# Patient Record
Sex: FEMALE | Race: WHITE
[De-identification: names, ages, dates, MRNs, and addresses within clinical notes are randomized per-mention and may not be internally consistent; named-entity substitution may affect disease eponyms.]

---

## 2017-05-20 NOTE — EDM.PDOC
[id number]: TICK BITE


Time Seen by Provider: 05/20/17 20:10


Source of Information: Reports: Patient


History Limitations: Reports: No Limitations





- History of Present Illness


INITIAL COMMENTS - FREE TEXT/NARRATIVE: 





pt has a small tick imbedded in area between her breasts. She was not able to 

get it completely out. 


Onset: Today


Duration: Hour(s):, Other (pt removed part of the tick. )


Location: Reports: Chest


Associated Symptoms: Reports: No Other Symptoms





- Related Data


 Allergies











Allergy/AdvReac Type Severity Reaction Status Date / Time


 


oxaprozin [Oxaprozin] Allergy Unknown Cannot Unverified 05/20/17 19:38





   Remember  











Home Meds: 


 Home Meds





Aspirin [Low Dose Aspirin EC] 81 mg PO DAILY 10/01/14 [History]


Clopidogrel Bisulfate [Clopidogrel] 75 mg PO DAILY 10/01/14 [History]


Levothyroxine Sodium [Synthroid] 75 mcg PO DAILY 10/01/14 [History]


Lisinopril 40 mg PO DAILY 10/01/14 [History]


Multivitamin [Daily Multiple Vitamin] 1 tab PO DAILY 10/01/14 [History]


Rosuvastatin [Crestor] 40 mg PO BEDTIME 10/01/14 [History]


amLODIPine Besylate [Amlodipine Besylate] 10 mg PO DAILY 10/01/14 [History]


metFORMIN [Glucophage XR] 500 mg PO DAILY 10/01/14 [History]


Atenolol 50 mg PO DAILY #30 tablet 10/03/14 [Rx]


Hydrocodone/Acetaminophen [Hydrocodon-Acetaminophen 5-325] 1 - 2 tab PO Q6H PRN 

03/02/15 [History]


Omeprazole [Prilosec] 20 mg PO DAILY 03/02/15 [History]


predniSONE [Prednisone] 5 mg PO DAILY 03/02/15 [History]











Past Medical History





- Infectious Disease History


Infectious Disease History: Reports: Chicken Pox





Social & Family History





- Tobacco Use


Smoking Status *Q: Never Smoker


Years of Tobacco use: 30


Used Tobacco, but Quit: Yes


Month Tobacco Last Used: 1999


Second Hand Smoke Exposure: No





- Caffeine Use


Caffeine Use: Reports: Coffee, Tea





- Alcohol Use


Days Per Week of Alcohol Use: 0


Number of Drinks Per Day: 1


Total Drinks Per Week: 0





- Recreational Drug Use


Recreational Drug Use: No





ED ROS GENERAL





- Review of Systems


Review Of Systems: See Below


Constitutional: Reports: No Symptoms


HEENT: Reports: No Symptoms


Respiratory: Reports: No Symptoms


Cardiovascular: Reports: No Symptoms


Endocrine: Reports: No Symptoms


GI/Abdominal: Reports: No Symptoms


: Reports: No Symptoms


Skin: Reports: Other ( deer tick stuck between her breasts. This was cleaned 

and removed without difficulty. )





ED EXAM, ANIMAL BITE





- Physical Exam


Exam: See Below


Exam Limited By: No Limitations


General Appearance: Alert


Ears: Normal External Exam


Nose: Normal Inspection


Throat/Mouth: Normal Inspection


Skin Exam: Other (pt had a deer tick stuck between her breasts . Only part of 

the tick was removed. The rest was removed with out difficulty)





Course





- Vital Signs


Last Recorded V/S: 


 Last Vital Signs











Temp  35.9 C   05/20/17 19:38


 


Pulse  70   05/20/17 19:38


 


Resp  16   05/20/17 19:38


 


BP  156/82 H  05/20/17 19:38


 


Pulse Ox  95   05/20/17 19:38














Departure





- Departure


Time of Disposition: 20:05


Disposition: Home, Self-Care 01


Condition: fair


Clinical Impression: 


 Dermacentor andersoni tick bite








- Discharge Information


Instructions:  Insect Bite, Easy-to-Read


Referrals: 


Tejas Oconnor MD [Primary Care Provider] - 


Forms:  ED Department Discharge


Care Plan Goals: 


doxycyline 100mg bid for 7 days.  moist warm packs.

## 2017-11-23 NOTE — EDM.PDOC
ED HPI GENERAL MEDICAL PROBLEM





- General


Chief Complaint: Neuro Symptoms/Deficits


Stated Complaint: MEDICAL VIA NORTH


Time Seen by Provider: 11/23/17 14:00


Source of Information: Reports: Patient, EMS, Family


History Limitations: Reports: No Limitations





- History of Present Illness


INITIAL COMMENTS - FREE TEXT/NARRATIVE: 





77-year-old female had just finished a large meal when she felt the pain in her 

shoulders and felt uncomfortable. She then felt lightheaded and woozy, her 

speech was slurred and she became very pale and diaphoretic. She had had a 

previous stroke in the family got very concerned so called EMS. A family member 

was an EMT and took her blood pressure which was low and her pulse was only 60. 

When EMS arrived her symptoms were resolving, she was stable in route and had 

no neurologic deficits. She had no chest pain, shortness of breath, 

palpitations or other symptoms.


Onset: Sudden


Severity: Moderate


Associated Symptoms: Reports: Diaphoresis, Weakness.  Denies: Fever/Chills, 

Headaches, Nausea/Vomiting, Shortness of Breath


  ** Right Shoulder


Pain Score (Numeric/FACES): 1





- Related Data


 Allergies











Allergy/AdvReac Type Severity Reaction Status Date / Time


 


oxaprozin [Oxaprozin] Allergy Unknown Cannot Verified 11/23/17 13:55





   Remember  











Home Meds: 


 Home Meds





Aspirin [Low Dose Aspirin EC] 81 mg PO DAILY 10/01/14 [History]


Clopidogrel Bisulfate [Clopidogrel] 75 mg PO DAILY 10/01/14 [History]


Levothyroxine Sodium [Synthroid] 75 mcg PO DAILY 10/01/14 [History]


Lisinopril 40 mg PO DAILY 10/01/14 [History]


Multivitamin [Daily Multiple Vitamin] 1 tab PO DAILY 10/01/14 [History]


Rosuvastatin [Crestor] 40 mg PO BEDTIME 10/01/14 [History]


amLODIPine Besylate [Amlodipine Besylate] 10 mg PO DAILY 10/01/14 [History]


metFORMIN [Glucophage XR] 500 mg PO DAILY 10/01/14 [History]


Atenolol 50 mg PO DAILY #30 tablet 10/03/14 [Rx]


Hydrocodone/Acetaminophen [Hydrocodon-Acetaminophen 5-325] 1 - 2 tab PO Q6H PRN 

03/02/15 [History]


Omeprazole [Prilosec] 20 mg PO DAILY 03/02/15 [History]


predniSONE [Prednisone] 5 mg PO DAILY 03/02/15 [History]











Past Medical History


Cardiovascular History: Reports: High Cholesterol, Hypertension


OB/GYN History: Reports: Pregnancy


Other OB/BYN History: HYSTERECTOMY


Neurological History: Reports: CVA


Endocrine/Metabolic History: Reports: Diabetes, Type II, Hypothyroidism


Other Hematologic History: BILAT LE DVT





- Infectious Disease History


Infectious Disease History: Reports: Chicken Pox





- Past Surgical History


Other HEENT Surgeries/Procedures: PREVIOUS CVA 2002


Musculoskeletal Surgical History: Reports: Knee Replacement





Social & Family History





- Tobacco Use


Smoking Status *Q: Unknown Ever Smoked


Years of Tobacco use: 30


Used Tobacco, but Quit: Yes


Month Tobacco Last Used: 1999


Second Hand Smoke Exposure: No





- Caffeine Use


Caffeine Use: Reports: Coffee, Tea





- Alcohol Use


Days Per Week of Alcohol Use: 0


Number of Drinks Per Day: 1


Total Drinks Per Week: 0





- Recreational Drug Use


Recreational Drug Use: No





ED ROS GENERAL





- Review of Systems


Review Of Systems: See Below


Constitutional: Denies: Fever, Chills


Respiratory: Denies: Shortness of Breath


Cardiovascular: Denies: Chest Pain, Palpitations


GI/Abdominal: Denies: Abdominal Pain


Musculoskeletal: Reports: Shoulder Pain (Bilateral shoulder pain), Other (

Chronic arthritic pains)


Skin: Reports: Pallor, Diaphoresis





ED EXAM, NEURO





- Physical Exam


Exam: See Below


Exam Limited By: No Limitations


General Appearance: Alert, No Apparent Distress


Eye Exam: Bilateral Eye: Normal Inspection


Throat/Mouth: Normal Inspection


Neck: Normal Inspection


Respiratory/Chest: No Respiratory Distress, Lungs Clear


Cardiovascular: Regular Rate, Rhythm.  No: Extra Beats


GI/Abdominal: Normal Bowel Sounds


Neurological: Alert, No Motor/Sensory Deficits, Oriented x 3


Psychiatric: Normal Affect, Normal Mood


Skin Exam: Warm, Dry





Course





- Vital Signs


Last Recorded V/S: 


 Last Vital Signs











Temp  97.9 F   11/23/17 13:49


 


Pulse  72   11/23/17 14:53


 


Resp  14   11/23/17 14:15


 


BP  119/58 L  11/23/17 14:53


 


Pulse Ox  94 L  11/23/17 13:49














- Re-Assessments/Exams


Free Text/Narrative Re-Assessment/Exam: 





11/23/17 14:20


Glucose was checked by EMS which was normal. EKG by EMS was also normal. The 

symptoms this patient had are classic for vasovagal syncope or near syncope, 

and she just recently had labs drawn by her rheumatologist for medication 

changes. She was ambulated without difficulty so was discharged without any 

further workup.








Departure





- Departure


Time of Disposition: 15:02


Disposition: Home, Self-Care 01


Condition: Good


Clinical Impression: 


 Vasovagal near syncope








- Discharge Information


Instructions:  Near-Syncope, Easy-to-Read


Referrals: 


Tejas Oconnor MD [Primary Care Provider] - 


Forms:  ED Department Discharge


Care Plan Goals: 


Continue your current medications, stay hydrated and increase activity as 

tolerated. Return anytime if worsening or concerns.

## 2020-06-20 ENCOUNTER — HOSPITAL ENCOUNTER (EMERGENCY)
Dept: HOSPITAL 11 - JP.ED | Age: 80
Discharge: NURSING, REHAB OR LONG-TERM CARE | End: 2020-06-20
Payer: MEDICARE

## 2020-06-20 VITALS — SYSTOLIC BLOOD PRESSURE: 134 MMHG | HEART RATE: 73 BPM | DIASTOLIC BLOOD PRESSURE: 53 MMHG

## 2020-06-20 DIAGNOSIS — Z79.899: ICD-10-CM

## 2020-06-20 DIAGNOSIS — E11.9: ICD-10-CM

## 2020-06-20 DIAGNOSIS — N13.2: Primary | ICD-10-CM

## 2020-06-20 DIAGNOSIS — Z87.891: ICD-10-CM

## 2020-06-20 DIAGNOSIS — N28.9: ICD-10-CM

## 2020-06-20 DIAGNOSIS — R11.2: ICD-10-CM

## 2020-06-20 DIAGNOSIS — E03.9: ICD-10-CM

## 2020-06-20 DIAGNOSIS — E78.00: ICD-10-CM

## 2020-06-20 DIAGNOSIS — Z79.82: ICD-10-CM

## 2020-06-20 DIAGNOSIS — Z86.73: ICD-10-CM

## 2020-06-20 DIAGNOSIS — I10: ICD-10-CM

## 2020-06-20 DIAGNOSIS — Z88.6: ICD-10-CM

## 2020-06-20 PROCEDURE — 96375 TX/PRO/DX INJ NEW DRUG ADDON: CPT

## 2020-06-20 PROCEDURE — 82272 OCCULT BLD FECES 1-3 TESTS: CPT

## 2020-06-20 PROCEDURE — 87086 URINE CULTURE/COLONY COUNT: CPT

## 2020-06-20 PROCEDURE — 74176 CT ABD & PELVIS W/O CONTRAST: CPT

## 2020-06-20 PROCEDURE — 86140 C-REACTIVE PROTEIN: CPT

## 2020-06-20 PROCEDURE — 96361 HYDRATE IV INFUSION ADD-ON: CPT

## 2020-06-20 PROCEDURE — 36415 COLL VENOUS BLD VENIPUNCTURE: CPT

## 2020-06-20 PROCEDURE — 85025 COMPLETE CBC W/AUTO DIFF WBC: CPT

## 2020-06-20 PROCEDURE — 96374 THER/PROPH/DIAG INJ IV PUSH: CPT

## 2020-06-20 PROCEDURE — 81001 URINALYSIS AUTO W/SCOPE: CPT

## 2020-06-20 PROCEDURE — 80053 COMPREHEN METABOLIC PANEL: CPT

## 2020-06-20 PROCEDURE — 99285 EMERGENCY DEPT VISIT HI MDM: CPT

## 2020-06-20 NOTE — CRLCT
INDICATION:



Right-sided pain, renal insufficiency 



TECHNIQUE:



CT abdomen and pelvis without contrast.



COMPARISON:



None 



FINDINGS:



The visualized portions of the lung bases are clear. 



There is moderate right hydronephrosis with a 8 mm obstructing stone (HU 

310) in the proximal right ureter, approximately 3.5 cm from the 

ureteropelvic junction. There is moderate perinephric fat stranding. 

Additional punctate stones are seen within both kidneys. The largest stone 

on the left is seen within the upper pole and measures 4 mm. Negative for 

left hydronephrosis. The bladder is partially distended and unremarkable. 



Evaluation of the abdominal viscera is limited due to lack of IV contrast. 

There is a 0.8 cm hypoattenuating lesion within the posterior aspect of the 

right hepatic lobe, too small to fully characterize, however likely 

represents a simple cyst. There remainder of the unenhanced liver is 

unremarkable. The gallbladder is nondistended. The spleen, pancreas and 

adrenal glands are unremarkable. 



There are no dilated loops of small bowel to suggest obstruction.The 

appendix is not visualized.There is a trace amount of free fluid within the 

pelvis. Negative for intraperitoneal free air. 



The visualized osseous structures are unremarkable. There is moderate 

atherosclerotic calcification of the abdominal aorta and common iliacs. 



IMPRESSION:



1. Moderate right hydronephrosis and perinephric inflammatory changes with 

a 8 mm obstructing stone in the proximal right ureter. 



2. Additional nonobstructing stones bilaterally.



Dictated by Diamond Michael MD @ 6/20/2020 11:14:35 AM



Please note that all CT scans at this facility use dose modulation, 

iterative reconstruction, and/or weight-based dosing when appropriate to 

reduce radiation dose to as low as reasonably achievable.



Dictated by: Diamond Michael MD @ 06/20/2020 11:14:49



(Electronically Signed)

## 2020-06-20 NOTE — EDM.PDOC
ED HPI GENERAL MEDICAL PROBLEM





- General


Chief Complaint: Abdominal Pain


Stated Complaint: R ABD PAIN, DIARRHEA, VOMITING


Time Seen by Provider: 06/20/20 09:15


Source of Information: Reports: Patient, Family


History Limitations: Reports: No Limitations





- History of Present Illness


INITIAL COMMENTS - FREE TEXT/NARRATIVE: 





pt arrived with rt sided abdomanal pain. She started vomiting in the nite and 

she ended up with alot of loose stools.   The pain has been difficult. 


Onset: Today, Other ( started in the nite. )


Duration: Hour(s):


Location: Reports: Abdomen


Associated Symptoms: Reports: Nausea/Vomiting, Other ( diarrhea)


  ** Right Lower Abdomen


Pain Score (Numeric/FACES): 7





- Related Data


                                    Allergies











Allergy/AdvReac Type Severity Reaction Status Date / Time


 


oxaprozin [Oxaprozin] Allergy Unknown Cannot Verified 06/20/20 08:59





   Remember  











Home Meds: 


                                    Home Meds





Aspirin [Low Dose Aspirin EC] 81 mg PO DAILY 10/01/14 [History]


Clopidogrel Bisulfate [Clopidogrel] 75 mg PO DAILY 10/01/14 [History]


Levothyroxine Sodium [Synthroid] 75 mcg PO DAILY 10/01/14 [History]


Lisinopril 40 mg PO DAILY 10/01/14 [History]


Multivitamin [Daily Multiple Vitamin] 1 tab PO DAILY 10/01/14 [History]


Rosuvastatin [Crestor] 40 mg PO BEDTIME 10/01/14 [History]


amLODIPine Besylate [Amlodipine Besylate] 10 mg PO DAILY 10/01/14 [History]


metFORMIN [Glucophage XR] 500 mg PO DAILY 10/01/14 [History]


atenoloL [Atenolol] 50 mg PO DAILY #30 tablet 10/03/14 [Rx]


Omeprazole [Prilosec] 20 mg PO DAILY 03/02/15 [History]











Past Medical History


HEENT History: Reports: Impaired Vision


Cardiovascular History: Reports: High Cholesterol, Hypertension


OB/GYN History: Reports: Pregnancy


Other OB/GYN History: HYSTERECTOMY


Neurological History: Reports: CVA


Endocrine/Metabolic History: Reports: Diabetes, Type II, Hypothyroidism


Other Hematologic History: BILAT LE DVT





- Infectious Disease History


Infectious Disease History: Reports: Chicken Pox, Measles, Mumps





- Past Surgical History


Other HEENT Surgeries/Procedures: PREVIOUS CVA 2002


GI Surgical History: Reports: Colonoscopy


Female  Surgical History: Reports: Hysterectomy, Salpingo-Oophorectomy


Musculoskeletal Surgical History: Reports: Knee Replacement





Social & Family History





- Tobacco Use


Smoking Status *Q: Former Smoker


Years of Tobacco use: 30


Packs/Tins Daily: 1


Used Tobacco, but Quit: Yes


Month/Year Tobacco Last Used: 1999


Second Hand Smoke Exposure: No





- Caffeine Use


Caffeine Use: Reports: Coffee





- Recreational Drug Use


Recreational Drug Use: No





ED ROS GENERAL





- Review of Systems


Review Of Systems: See Below


Constitutional: Reports: Diaphoresis


HEENT: Reports: No Symptoms


Respiratory: Reports: No Symptoms


Cardiovascular: Reports: No Symptoms


Endocrine: Reports: No Symptoms


GI/Abdominal: Reports: Abdominal Pain, Diarrhea, Nausea, Vomiting


: Reports: No Symptoms


Musculoskeletal: Reports: No Symptoms


Skin: Reports: No Symptoms





ED EXAM, GI/ABD





- Physical Exam


Exam: See Below


Text/Narrative:: 





pt arrived with severe rt lower abdomanal pain.  She has been vomiting most of 

the nite. 


Exam Limited By: No Limitations


General Appearance: Alert, Anxious, Moderate Distress, Other (pupils equal and 

reactive to lite. )


Ears: Normal TMs


Nose: Normal Inspection


Throat/Mouth: Normal Inspection


Head: Atraumatic


Neck: Normal Inspection


Respiratory/Chest: No Respiratory Distress


Cardiovascular: Regular Rate, Rhythm


GI/Abdominal Exam: Soft, Tender, Other (pt is tender in the rt lower abdoman. )


 (Female) Exam: Deferred


Rectal (Female) Exam: Deferred


Back Exam: Normal Inspection


Extremities: Normal Inspection





Course





- Vital Signs


Last Recorded V/S: 


                                Last Vital Signs











Temp  36.0 C L  06/20/20 09:11


 


Pulse  73   06/20/20 12:52


 


Resp  16   06/20/20 09:11


 


BP  134/53 L  06/20/20 12:52


 


Pulse Ox  94 L  06/20/20 12:52














- Orders/Labs/Meds


Labs: 


                                Laboratory Tests











  06/20/20 06/20/20 06/20/20 Range/Units





  08:56 09:06 09:06 


 


WBC   10.4   (4.5-11.0)  K/uL


 


RBC   3.55   (3.30-5.50)  M/uL


 


Hgb   10.4 L   (12.0-15.0)  g/dL


 


Hct   32.8 L   (36.0-48.0)  %


 


MCV   92   (80-98)  fL


 


MCH   29   (27-31)  pg


 


MCHC   32   (32-36)  %


 


Plt Count   277   (150-400)  K/uL


 


Neut % (Auto)   81 H   (36-66)  %


 


Lymph % (Auto)   13 L   (24-44)  %


 


Mono % (Auto)   6   (2-6)  %


 


Eos % (Auto)   0 L   (2-4)  %


 


Baso % (Auto)   0   (0-1)  %


 


Sodium    141  (140-148)  mmol/L


 


Potassium    4.3  (3.6-5.2)  mmol/L


 


Chloride    105  (100-108)  mmol/L


 


Carbon Dioxide    22  (21-32)  mmol/L


 


Anion Gap    14.1 H  (5.0-14.0)  mmol/L


 


BUN    33 H  (7-18)  mg/dL


 


Creatinine    2.4 H D  (0.6-1.0)  mg/dL


 


Est Cr Clr Drug Dosing    15.46  mL/min


 


Estimated GFR (MDRD)    19 L  (>60)  


 


Glucose    171 H  ()  mg/dL


 


Calcium    10.0  (8.5-10.1)  mg/dL


 


Total Bilirubin    0.3  (0.2-1.0)  mg/dL


 


AST    19  (15-37)  U/L


 


ALT    29  (12-78)  U/L


 


Alkaline Phosphatase    175 H D  ()  U/L


 


C-Reactive Protein    1.66 H  (0.0-0.3)  mg/dL


 


Total Protein    7.2  (6.4-8.2)  g/dL


 


Albumin    3.3 L  (3.4-5.0)  g/dL


 


Globulin    3.9 H  (2.3-3.5)  g/dL


 


Albumin/Globulin Ratio    0.9 L  (1.2-2.2)  


 


Urine Color  Yellow    (YELLOW)  


 


Urine Appearance  Cloudy A    (CLEAR)  


 


Urine pH  6.0    (5.0-8.0)  


 


Ur Specific Gravity  1.025    (1.008-1.030)  


 


Urine Protein  >=300 H    (NEGATIVE)  mg/dL


 


Urine Glucose (UA)  Negative    (NEGATIVE)  mg/dL


 


Urine Ketones  Trace H    (NEGATIVE)  mg/dL


 


Urine Occult Blood  Large H    (NEGATIVE)  


 


Urine Nitrite  Negative    (NEGATIVE)  


 


Urine Bilirubin  Negative    (NEGATIVE)  


 


Urine Urobilinogen  0.2    (0.2-1.0)  EU/dL


 


Ur Leukocyte Esterase  Negative    (NEGATIVE)  


 


Urine RBC  50-75 H    (0-5)  


 


Urine WBC  0-5    (0-5)  


 


Ur Epithelial Cells  Not seen    


 


Amorphous Sediment  Not seen    


 


Urine Bacteria  Not seen    


 


Urine Mucus  Moderate    


 


Urine Other      











Meds: 


Medications














Discontinued Medications














Generic Name Dose Route Start Last Admin





  Trade Name Wong  PRN Reason Stop Dose Admin


 


Hydromorphone HCl  0.5 mg  06/20/20 09:44  06/20/20 11:01





  Dilaudid  IVPUSH  06/20/20 09:45  0.5 mg





  ONETIME ONE   Administration


 


Sodium Chloride  1,000 mls @ 999 mls/hr  06/20/20 09:45  06/20/20 10:59





  Normal Saline  IV   999 mls/hr





  ASDIRECTED EDSON   Administration


 


Sodium Chloride  1,000 mls @ 500 mls/hr  06/20/20 11:15  06/20/20 12:44





  Normal Saline  IV   500 mls/hr





  ASDIRECTED EDSON   Administration


 


Ondansetron HCl  4 mg  06/20/20 09:43  06/20/20 11:00





  Zofran  IVPUSH  06/20/20 09:44  4 mg





  ONETIME ONE   Administration


 


Tamsulosin HCl  0.4 mg  06/20/20 12:50  06/20/20 12:57





  Flomax  PO  06/20/20 12:51  0.4 mg





  ONETIME ONE   Administration














- Re-Assessments/Exams


Free Text/Narrative Re-Assessment/Exam: 





06/20/20 12:26


pt has a gfr of 19. She recently had much better kidney funtion. She does not 

have a fever


06/20/20 13:11


pt has a 8mm stone in the rt ureter ith obstruction


06/20/20 13:11


pt was given rocephen 1 gm iv. 


06/22/20 07:13








Departure





- Departure


Time of Disposition: 13:12


Disposition: DC/[facility]fer to Acute Hospital 02


Condition: Fair


Clinical Impression: 


 Renal insufficiency, Ureteral calculus, right








- Discharge Information


Referrals: 


PCP,None [Primary Care Provider] - 


Forms:  ED Department Discharge


Care Plan Goals: 


 transfer to [facility]. 





Sepsis Event Note (ED)





- Evaluation


Sepsis Screening Result: No Definite Risk What Is The Reason For Today's Visit?: the development of new lesions

## 2021-03-18 ENCOUNTER — HOSPITAL ENCOUNTER (OUTPATIENT)
Dept: HOSPITAL 11 - JP.BLOODTR | Age: 81
Setting detail: OBSERVATION
LOS: 1 days | Discharge: HOME | End: 2021-03-19
Attending: INTERNAL MEDICINE | Admitting: INTERNAL MEDICINE
Payer: MEDICARE

## 2021-03-18 DIAGNOSIS — E78.00: ICD-10-CM

## 2021-03-18 DIAGNOSIS — D72.819: ICD-10-CM

## 2021-03-18 DIAGNOSIS — Z79.899: ICD-10-CM

## 2021-03-18 DIAGNOSIS — N18.32: ICD-10-CM

## 2021-03-18 DIAGNOSIS — Z79.890: ICD-10-CM

## 2021-03-18 DIAGNOSIS — E03.9: ICD-10-CM

## 2021-03-18 DIAGNOSIS — Z88.8: ICD-10-CM

## 2021-03-18 DIAGNOSIS — E11.22: ICD-10-CM

## 2021-03-18 DIAGNOSIS — Z88.2: ICD-10-CM

## 2021-03-18 DIAGNOSIS — I12.9: ICD-10-CM

## 2021-03-18 DIAGNOSIS — C25.9: ICD-10-CM

## 2021-03-18 DIAGNOSIS — Z79.82: ICD-10-CM

## 2021-03-18 DIAGNOSIS — D64.81: Primary | ICD-10-CM

## 2021-03-18 DIAGNOSIS — Z98.890: ICD-10-CM

## 2021-03-18 DIAGNOSIS — D69.6: ICD-10-CM

## 2021-03-18 DIAGNOSIS — R60.0: ICD-10-CM

## 2021-03-18 PROCEDURE — P9034 PLATELETS, PHERESIS: HCPCS

## 2021-03-18 PROCEDURE — P9016 RBC LEUKOCYTES REDUCED: HCPCS

## 2021-03-18 PROCEDURE — G0378 HOSPITAL OBSERVATION PER HR: HCPCS

## 2021-03-18 RX ADMIN — METOPROLOL TARTRATE SCH MG: 50 TABLET, FILM COATED ORAL at 20:25

## 2021-03-18 NOTE — PCM.HP.2
H&P History of Present Illness





- General


Date of Service: 03/18/21


Admit Problem/Dx: 


                           Admission Diagnosis/Problem





Admission Diagnosis/Problem      Anemia








Source of Information: Patient, Family, Provider, RN Notes Reviewed


History Limitations: Reports: No Limitations





- History of Present Illness


Initial Comments - Free Text/Narative: 





Ms. Alvarenga is an 80-year-old woman who was admitted as a direct admission from 

the outpatient area with anemia and severe thrombocytopenia.  She has a known 

history of stage IV pancreatic carcinoma and has been receiving chemotherapy 

every other week.  There has been a good response with current chemotherapeutic 

regimen and a good decrease in tumor marker.  Since her last chemotherapy 

approximately 1 week ago she has felt more weak and tired.  Appetite has been 

somewhat poor.  Labs were obtained today, hemoglobin was found to be 7.5 which 

is a decrease over the past week and platelets were 7000.  She has had no 

evidence of spontaneous bleeding or underlying infection.  During the past week 

is also developed mild to moderate peripheral edema both lower extremities as 

well as her right upper extremity.  She has had a history of chronic kidney 

disease stage III, over the past week there has been an increase in her 

creatinine from 1.4 to 2.1.





- Related Data


Allergies/Adverse Reactions: 


                                    Allergies











Allergy/AdvReac Type Severity Reaction Status Date / Time


 


oxaprozin [Oxaprozin] Allergy Unknown Cannot Verified 02/19/21 08:39





   Remember  











Home Medications: 


                                    Home Meds





Aspirin [Low Dose Aspirin EC] 81 mg PO DAILY 10/01/14 [History]


Clopidogrel Bisulfate [Clopidogrel] 75 mg PO DAILY 10/01/14 [History]


Levothyroxine Sodium [Synthroid] 75 mcg PO DAILY 10/01/14 [History]


Rosuvastatin [Crestor] 40 mg PO BEDTIME 10/01/14 [History]


amLODIPine Besylate [Amlodipine Besylate] 10 mg PO DAILY 10/01/14 [History]


Omeprazole [Prilosec] 20 mg PO DAILY 03/02/15 [History]


Acetaminophen 650 mg PO Q6HR PRN 12/11/20 [History]


Cholecalciferol (Vitamin D3) [Vitamin D3] 1,000 unit PO DAILY 12/11/20 [History]


Cyanocobalamin (Vitamin B-12) [Vitamin B-12] 1,000 mcg PO DAILY 12/11/20 

[History]


Docusate Sodium [Colace] 100 mg PO BID PRN 12/11/20 [History]


Metoprolol Tartrate [Lopressor] 50 mg PO BID 12/11/20 [History]


bisacodyL [Dulcolax] 5 mg PO DAILY PRN 12/11/20 [History]











Past Medical History


HEENT History: Reports: Impaired Vision


Cardiovascular History: Reports: High Cholesterol, Hypertension


OB/GYN History: Reports: Pregnancy


Other OB/BYN History: HYSTERECTOMY


Neurological History: Reports: CVA


Endocrine/Metabolic History: Reports: Diabetes, Type II, Hypothyroidism


Other Hematologic History: BILAT LE DVT





- Infectious Disease History


Infectious Disease History: Reports: Chicken Pox, Measles, Mumps





- Past Surgical History


Other HEENT Surgeries/Procedures: PREVIOUS CVA 2002


GI Surgical History: Reports: Colonoscopy


Female  Surgical History: Reports: Hysterectomy, Salpingo-Oophorectomy


Musculoskeletal Surgical History: Reports: Knee Replacement





Social & Family History





- Caffeine Use


Caffeine Use: Reports: Coffee





H&P Review of Systems





- Review of Systems:


Review Of Systems: See Below


General: Reports: Malaise, Weakness, Fatigue, Decreased Appetite.  Denies: 

Fever, Chills, Diaphoresis


HEENT: Reports: No Symptoms


Pulmonary: Reports: No Symptoms


Cardiovascular: Reports: Edema.  Denies: Chest Pain, Palpitations, Dyspnea on 

Exertion, Orthopnea, PND


Gastrointestinal: Reports: No Symptoms


Genitourinary: Reports: No Symptoms


Musculoskeletal: Reports: No Symptoms


Skin: Reports: No Symptoms


Psychiatric: Reports: No Symptoms


Neurological: Reports: No Symptoms


Hematologic/Lymphatic: Reports: Anemia


Immunologic: Reports: No Symptoms





Exam





- Exam


Exam: See Below





- Vital Signs


Vital Signs: 


                                Last Vital Signs











Temp  96.8 F L  03/18/21 12:32


 


Pulse  70   03/18/21 12:32


 


Resp  16   03/18/21 12:32


 


BP  118/59 L  03/18/21 12:32


 


Pulse Ox  99   03/18/21 12:32














- Exam


Quality Assessment: DVT Prophylaxis


General: Alert, Oriented, Cooperative, Mild Distress


HEENT: Conjunctiva Clear, Hearing Intact, Mucosa Moist & Pink, Normal Nasal 

Septum, Posterior Pharynx Clear, Pupils Equal


Neck: Supple, Trachea Midline, +2 Carotid Pulse wo Bruit


Lungs: Clear to Auscultation, Normal Respiratory Effort


Cardiovascular: Regular Rate, Regular Rhythm, Normal S1, Normal S2.  No: 

Systolic Murmur, Diastolic Murmur


GI/Abdominal Exam: Soft, Non-Tender, No Organomegaly, No Distention


Back Exam: Normal Inspection, Full Range of Motion


Extremities: Non-Tender, Pedal Edema


Skin: Warm, Dry, Intact


Neurological: Cranial Nerves Intact, Strength Equal Bilateral, Normal Speech, 

Normal Tone, Sensation Intact.  No: Focal Deficit


Neuro Extensive - Mental Status: Alert, Oriented x3, Normal Mood/Affect, Normal 

Cognition, Memory Intact





Sepsis Event Note





- Focused Exam


Vital Signs: 


                                   Vital Signs











  Temp Pulse Resp BP Pulse Ox


 


 03/18/21 12:32  96.8 F L  70  16  118/59 L  99














*Q Meaningful Use (ADM)





- VTE *Q


VTE Pharmacological Contraindications *Q: Thrombocytopenia





- VTE Risk Assess *Q


Each Risk Factor Represents 1 Point: None


Total Score 1 Point Risk Factors: 0


Each Risk Factor Represents 2 Points: Malignancy (present or previous)


Total Score 2 Point Risk Factors: 2


Each Risk Factor Represents 3 Points: Age 75 Years or Greater


Total Score 3 Point Risk Factors: 3


Each Risk Factor Represents 5 Points: None


Total Score 5 Point Risk Factors: 0


Venous Thromboembolism Risk Factor Score *Q: 5


Problem List Initiated/Reviewed/Updated: Yes


Orders Last 24hrs: 


                               Active Orders 24 hr











 Category Date Time Status


 


 Patient Status Manage Transfer [TRANSFER] Routine ADT  03/18/21 12:06 Active


 


 PLATELETS [facility] [BBK] Routine Lab  03/18/21 10:54 Ordered


 


 RED BLOOD CELLS LP [BBK] Routine Lab  03/18/21 10:54 Ordered


 


 TYPE AND SCREEN [BBK] Routine Lab  03/18/21 10:54 Ordered


 


 Furosemide [Lasix] Med  03/18/21 14:00 Once





 20 mg IVPUSH ONETIME ONE   


 


 Sodium Chloride 0.9% [Normal Saline] 1,000 ml Med  03/18/21 12:30 Active





 IV ASDIRECTED   


 


 Transfuse Platelets [COMM] Urgent Oth  03/18/21 16:00 Ordered


 


 Transfuse Red Blood Cells [COMM] Urgent Oth  03/18/21 10:54 Ordered


 


 Resuscitation Status Routine Resus Stat  03/18/21 12:08 Ordered








                                Medication Orders





Furosemide (Furosemide 20 Mg/2 Ml Vial)  20 mg IVPUSH ONETIME ONE


   Stop: 03/18/21 14:01


Sodium Chloride (Normal Saline)  1,000 mls @ 50 mls/hr IV ASDIRECTED EDSON


   Stop: 03/18/21 18:00


   Last Admin: 03/18/21 12:15  Dose: 50 mls/hr


   Documented by: CARLOS








Assessment/Plan Comment:: 





ASSESSMENT AND PLAN





ANEMIA AND THROMBOCYTOPENIA-secondary to recent chemotherapy, there is also 

leukopenia but not absolute neutropenia


-Transfuse 2 units of red blood cells


-Transfuse 1 unit of platelets


-Follow-up labs in a.m.





STAGE IV PANCREATIC CARCINOMA-she has been receiving palliative chemotherapy





CHRONIC KIDNEY DISEASE STAGE IIIb-creatinine has increased over the last week 

and now to 2.1.  Possibly secondary to dehydration and intravascular volume 

depletion


-Closely monitor urine output and renal function


-Reassess labs in a.m.





PERIPHERAL EDEMA-mild edema both lower extremities, more prominent mild to 

moderate edema right upper extremity


-She will receive furosemide 20 mg IV after first unit of red blood cells


-Reassess in a.m.





MAINTENANCE ISSUES


-DVT prophylaxis; hold on anticoagulation because of thrombocytopenia


-GI prophylaxis; not indicated


-Hinojosa catheter; not indicated


-Nutrition; regular diet


-Nicotine dependence; not required





CODE STATUS-DNR/DNI





ADMISSION STATUS-this patient will be admitted to observation status, expect no 

more than a one night hospital stay for evaluation and management of problems as

 outlined above.





DISPOSITION-anticipate discharge to home after the hospital stay.





PRIMARY CARE PROVIDER-Dr. Oconnor





- Mortality Measure


Prognosis:: Poor

## 2021-03-19 VITALS — SYSTOLIC BLOOD PRESSURE: 148 MMHG | DIASTOLIC BLOOD PRESSURE: 55 MMHG | HEART RATE: 82 BPM

## 2021-03-19 RX ADMIN — METOPROLOL TARTRATE SCH MG: 50 TABLET, FILM COATED ORAL at 09:29

## 2021-03-19 NOTE — PCM.DCSUM1
**Discharge Summary





- Hospital Course


Brief History: Ms. Alvarenga is an 80-year-old woman who was admitted as a direct 

admission, to observation status, from outpatient area with anemia and 

thrombocytopenia, secondary to recent chemotherapy for stage IV pancreatic 

carcinoma.





- Discharge Data


Discharge Date: 03/19/21


Discharge Disposition: Home, Self-Care 01


Condition: Fair





- Referral to Home Health


Primary Care Physician: 


PCP None








- Discharge Diagnosis/Problem(s)


(1) Anemia


SNOMED Code(s): 608599852


   ICD Code: D64.9 - ANEMIA, UNSPECIFIED   Status: Acute   Current Visit: Yes   





(2) Thrombocytopenia


SNOMED Code(s): 966297169


   ICD Code: D69.6 - THROMBOCYTOPENIA, UNSPECIFIED   Status: Acute   Current 

Visit: Yes   





(3) Pancreatic carcinoma


SNOMED Code(s): 049142655


   ICD Code: C25.9 - MALIGNANT NEOPLASM OF PANCREAS, UNSPECIFIED   Status: Acute

  Current Visit: Yes   





(4) Renal insufficiency


SNOMED Code(s): 445081704, 329618299


   ICD Code: N28.9 - DISORDER OF KIDNEY AND URETER, UNSPECIFIED   Status: Acute 

 Current Visit: No   





- Patient Summary/Data


Hospital Course: 





Ms. Alvarenga is an 80-year-old woman who was admitted as a direct admission from 

the outpatient area with anemia and severe thrombocytopenia.  She has a known 

history of stage IV pancreatic carcinoma and has been receiving chemotherapy 

every other week.  There has been a good response with current chemotherapeutic 

regimen and a good decrease in tumor marker.  Since her last chemotherapy 

approximately 1 week ago she has felt more weak and tired.  Appetite has been 

somewhat poor.  Labs were obtained today, hemoglobin was found to be 7.5 which 

is a decrease over the past week and platelets were 7000.  She has had no 

evidence of spontaneous bleeding or underlying infection.  During the past week 

is also developed mild to moderate peripheral edema both lower extremities as 

well as her right upper extremity.  She has had a history of chronic kidney 

disease stage III, over the past week there has been an increase in her 

creatinine from 1.4 to 2.1.  On admission blood and platelet infusions were 

continued as previously ordered.  She did receive IV furosemide after the first 

unit of red blood cells.  She remained stable throughout the night and by the 

following morning reported that she was feeling improved with increase in 

energy.  Globin level had come up to 9.2 and platelets were 30,000.  Renal 

function remained stable with a creatinine of 2.2.  She did not receive fluids 

during hospitalization because of the large amount of blood products that she 

did receive.  We discussed options for management including staying another 

night for IV fluids to see if this improves renal function.  Peripheral edema 

had improved significantly with almost total resolution of peripheral edema in 

the legs there was still mild edema noted in the right upper extremity.  She 

preferred to be discharged but will agree to come in for laboratory studies on 

Sunday, March 21.  She will attempt to increase fluid intake and will return 

sooner if she has further difficulty.  Activity will be as tolerated and she 

will resume her usual diet.  She already has follow-up appointment scheduled 

with oncology for Tuesday, March 23.





- Patient Instructions


Diet: Usual Diet as Tolerated


Activity: As Tolerated


Other/Special Instructions: She already has follow-up scheduled with oncology 

for Tuesday, March 23.  Please schedule follow-up laboratory studies at the 

hospital lab for Sunday morning; BMP and CBC with differential.  Have the 

patient wait until results are available and I will review them with her.





- Discharge Plan


*PRESCRIPTION DRUG MONITORING PROGRAM REVIEWED*: Not Applicable


*COPY OF PRESCRIPTION DRUG MONITORING REPORT IN PATIENT OLAYINKA: Not Applicable


Home Medications: 


                                    Home Meds





Levothyroxine Sodium [Synthroid] 75 mcg PO DAILY 10/01/14 [History]


Rosuvastatin [Crestor] 40 mg PO BEDTIME 10/01/14 [History]


amLODIPine Besylate [Amlodipine Besylate] 10 mg PO DAILY 10/01/14 [History]


Omeprazole [Prilosec] 20 mg PO DAILY 03/02/15 [History]


Acetaminophen 650 mg PO Q6HR PRN 12/11/20 [History]


Cholecalciferol (Vitamin D3) [Vitamin D3] 1,000 unit PO DAILY 12/11/20 [History]


Cyanocobalamin (Vitamin B-12) [Vitamin B-12] 1,000 mcg PO DAILY 12/11/20 

[History]


Docusate Sodium [Colace] 100 mg PO BID PRN 12/11/20 [History]


Metoprolol Tartrate [Lopressor] 50 mg PO BID 12/11/20 [History]


bisacodyL [Dulcolax] 5 mg PO DAILY PRN 12/11/20 [History]








Referrals: 


Veronica Pino MD [Ordering Only Provider] - 03/23/21 (Please refer to 

[facility] printout of upcoming appointment times on 3/23/21)





- Discharge Summary/Plan Comment


DC Time >30 min.: No





- Patient Data


Vitals - Most Recent: 


                                Last Vital Signs











Temp  97.8 F   03/19/21 07:55


 


Pulse  82   03/19/21 09:29


 


Resp  16   03/19/21 07:55


 


BP  148/55 H  03/19/21 09:29


 


Pulse Ox  99   03/19/21 07:55











Weight - Most Recent: 138 lb 7.205 oz


I&O - Last 24 hours: 


                                 Intake & Output











 03/18/21 03/19/21 03/19/21





 22:59 06:59 14:59


 


Intake Total 901 360 


 


Balance 901 360 











Lab Results - Last 24 hrs: 


                         Laboratory Results - last 24 hr











  03/18/21 03/19/21 03/19/21 Range/Units





  12:30 05:13 05:13 


 


WBC   2.7 L   (4.5-11.0)  K/uL


 


RBC   3.06 L   (3.30-5.50)  M/uL


 


Hgb   9.2 L   (12.0-15.0)  g/dL


 


Hct   27.3 L   (36.0-48.0)  %


 


MCV   89   (80-98)  fL


 


MCH   30   (27-31)  pg


 


MCHC   34   (32-36)  %


 


Plt Count   30 L   (150-400)  K/uL


 


Neut % (Auto)   59   (36-66)  %


 


Lymph % (Auto)   37   (24-44)  %


 


Mono % (Auto)   1 L   (2-6)  %


 


Eos % (Auto)   3   (2-4)  %


 


Baso % (Auto)   0   (0-1)  %


 


Sodium    143  (140-148)  mmol/L


 


Potassium    5.2  (3.6-5.2)  mmol/L


 


Chloride    109 H  (100-108)  mmol/L


 


Carbon Dioxide    20 L  (21-32)  mmol/L


 


Anion Gap    19.2 H  (5.0-14.0)  mmol/L


 


BUN    45 H  (7-18)  mg/dL


 


Creatinine    2.3 H  (0.6-1.0)  mg/dL


 


Est Cr Clr Drug Dosing    15.17  mL/min


 


Estimated GFR (MDRD)    20 L  (>60)  


 


Glucose    97  ()  mg/dL


 


Calcium    8.4 L D  (8.5-10.1)  mg/dL


 


Total Bilirubin    0.5  D  (0.2-1.0)  mg/dL


 


AST    30  (15-37)  U/L


 


ALT    28  (12-78)  U/L


 


Alkaline Phosphatase    116  ()  U/L


 


Total Protein    5.2 L  (6.4-8.2)  g/dL


 


Albumin    2.4 L  (3.4-5.0)  g/dL


 


Globulin    2.8  (2.3-3.5)  g/dL


 


Albumin/Globulin Ratio    0.9 L  (1.2-2.2)  


 


Blood Type  O POSITIVE    


 


Gel Antibody Screen  Negative    


 


Crossmatch  See Detail    











Med Orders - Current: 


                               Current Medications





Acetaminophen (Acetaminophen 325 Mg Tab)  650 mg PO Q4H PRN


   PRN Reason: Pain (Mild 1-3)/fever


   Last Admin: 03/19/21 02:29 Dose:  650 mg


   Documented by: 


Albuterol (Albuterol 0.083% 2.5 Mg/3 Ml Neb Soln)  2.5 mg NEB Q4H PRN


   PRN Reason: Shortness Of Breath/wheezing


Bisacodyl (Bisacodyl 5 Mg Tab)  5 mg PO DAILY PRN


   PRN Reason: Constipation


Docusate Sodium (Docusate Sodium 100 Mg Cap)  100 mg PO BID PRN


   PRN Reason: Constipation


Metoprolol Tartrate (Metoprolol Tartrate 50 Mg Tab **Own Med**)  50 mg PO BID 

[facility]


   Last Admin: 03/19/21 09:29 Dose:  50 mg


   Documented by: 


Ondansetron HCl (Ondansetron 4 Mg/2 Ml Sdv)  4 mg IV Q4H PRN


   PRN Reason: Nausea/Vomiting


Rosuvastatin ( Crestor) 40 Mg Tab * *Own Med**  0 each PO BEDTIME [facility]


   Last Admin: 03/18/21 20:25 Dose:  1 each


   Documented by: 


Levothyroxine 75mcg (Tab (Ptom))  1 each PO ACBREAKFAST [facility]


   Last Admin: 03/19/21 08:17 Dose:  1 each


   Documented by: 


Omeprazole 20mg Cap ((Ptom))  1 each PO ACBREAKFAST [facility]


   Last Admin: 03/19/21 08:18 Dose:  1 each


   Documented by: 


Polyethylene Glycol (Polyethylene Glycol 3350 Powder 17 Gm Packet)  17 gm PO 

DAILY PRN


   PRN Reason: Constipation


Sodium Chloride (Sodium Chloride 0.9% 10 Ml Syringe)  10 ml FLUSH ASDIRECTED PRN


   PRN Reason: Keep Vein Open





Discontinued Medications





Acetaminophen (Acetaminophen 325 Mg Tab)  650 mg PO NOW ONE


   Stop: 03/18/21 10:57


   Last Admin: 03/18/21 13:09 Dose:  650 mg


   Documented by: 


Diphenhydramine HCl (Diphenhydramine 25 Mg Cap)  25 mg PO ONETIME ONE


   Stop: 03/18/21 10:57


   Last Admin: 03/18/21 13:09 Dose:  25 mg


   Documented by: 


Furosemide (Furosemide 20 Mg/2 Ml Vial)  20 mg IVPUSH ONETIME ONE


   Stop: 03/18/21 14:01


   Last Admin: 03/18/21 16:21 Dose:  20 mg


   Documented by: 


Sodium Chloride (Normal Saline)  1,000 mls @ 50 mls/hr IV ASDIRECTED EDSON


   Stop: 03/18/21 18:00


   Last Admin: 03/18/21 12:15 Dose:  50 mls/hr


   Documented by: 


Metoprolol Tartrate (Metoprolol Tartrate 50 Mg Tab)  50 mg PO BID EDSON


Rosuvastatin Calcium (Rosuvastatin 10 Mg Tab)  40 mg PO BEDTIME EDSON











- Exam


Quality Assessment: Reports: DVT Prophylaxis


General: Reports: Alert, Oriented, Cooperative, Mild Distress


Lungs: Reports: Clear to Auscultation, Normal Respiratory Effort


Cardiovascular: Reports: Regular Rate, Regular Rhythm, No Murmurs


GI/Abdominal Exam: Soft, Non-Tender, No Organomegaly, No Distention


Extremities: Non-Tender, No Pedal Edema





*Q Meaningful Use (DIS)





- VTE *Q


VTE Pharmacological Contraindications *Q: Thrombocytopenia

## 2021-03-29 ENCOUNTER — HOSPITAL ENCOUNTER (OUTPATIENT)
Dept: HOSPITAL 11 - JP.SDS | Age: 81
Discharge: HOME | End: 2021-03-29
Attending: SURGERY
Payer: MEDICARE

## 2021-03-29 VITALS — HEART RATE: 77 BPM | SYSTOLIC BLOOD PRESSURE: 140 MMHG | DIASTOLIC BLOOD PRESSURE: 63 MMHG

## 2021-03-29 DIAGNOSIS — Z88.8: ICD-10-CM

## 2021-03-29 DIAGNOSIS — Z86.73: ICD-10-CM

## 2021-03-29 DIAGNOSIS — I12.9: ICD-10-CM

## 2021-03-29 DIAGNOSIS — C25.9: Primary | ICD-10-CM

## 2021-03-29 DIAGNOSIS — Z88.2: ICD-10-CM

## 2021-03-29 DIAGNOSIS — E03.9: ICD-10-CM

## 2021-03-29 DIAGNOSIS — I25.10: ICD-10-CM

## 2021-03-29 DIAGNOSIS — E78.00: ICD-10-CM

## 2021-03-29 DIAGNOSIS — N18.9: ICD-10-CM

## 2021-03-29 DIAGNOSIS — E11.22: ICD-10-CM

## 2021-03-29 PROCEDURE — C1788 PORT, INDWELLING, IMP: HCPCS

## 2021-03-29 PROCEDURE — 36561 INSERT TUNNELED CV CATH: CPT

## 2021-04-01 ENCOUNTER — HOSPITAL ENCOUNTER (INPATIENT)
Dept: HOSPITAL 11 - JP.ED | Age: 81
LOS: 4 days | Discharge: HOME HEALTH | DRG: 291 | End: 2021-04-05
Attending: INTERNAL MEDICINE | Admitting: INTERNAL MEDICINE
Payer: MEDICARE

## 2021-04-01 DIAGNOSIS — Z79.890: ICD-10-CM

## 2021-04-01 DIAGNOSIS — E78.00: ICD-10-CM

## 2021-04-01 DIAGNOSIS — Z79.82: ICD-10-CM

## 2021-04-01 DIAGNOSIS — D63.1: ICD-10-CM

## 2021-04-01 DIAGNOSIS — M48.061: ICD-10-CM

## 2021-04-01 DIAGNOSIS — I50.9: ICD-10-CM

## 2021-04-01 DIAGNOSIS — Z79.899: ICD-10-CM

## 2021-04-01 DIAGNOSIS — N17.9: ICD-10-CM

## 2021-04-01 DIAGNOSIS — E11.22: ICD-10-CM

## 2021-04-01 DIAGNOSIS — Z88.8: ICD-10-CM

## 2021-04-01 DIAGNOSIS — Z92.21: ICD-10-CM

## 2021-04-01 DIAGNOSIS — Z79.02: ICD-10-CM

## 2021-04-01 DIAGNOSIS — Z87.891: ICD-10-CM

## 2021-04-01 DIAGNOSIS — I50.33: ICD-10-CM

## 2021-04-01 DIAGNOSIS — E03.9: ICD-10-CM

## 2021-04-01 DIAGNOSIS — Z88.2: ICD-10-CM

## 2021-04-01 DIAGNOSIS — H54.7: ICD-10-CM

## 2021-04-01 DIAGNOSIS — C77.2: ICD-10-CM

## 2021-04-01 DIAGNOSIS — Z88.1: ICD-10-CM

## 2021-04-01 DIAGNOSIS — J96.01: ICD-10-CM

## 2021-04-01 DIAGNOSIS — N18.31: ICD-10-CM

## 2021-04-01 DIAGNOSIS — Z79.84: ICD-10-CM

## 2021-04-01 DIAGNOSIS — K21.9: ICD-10-CM

## 2021-04-01 DIAGNOSIS — Z79.01: ICD-10-CM

## 2021-04-01 DIAGNOSIS — M79.652: ICD-10-CM

## 2021-04-01 DIAGNOSIS — D84.9: ICD-10-CM

## 2021-04-01 DIAGNOSIS — Z87.442: ICD-10-CM

## 2021-04-01 DIAGNOSIS — Z86.718: ICD-10-CM

## 2021-04-01 DIAGNOSIS — Z96.659: ICD-10-CM

## 2021-04-01 DIAGNOSIS — D69.6: ICD-10-CM

## 2021-04-01 DIAGNOSIS — C25.9: ICD-10-CM

## 2021-04-01 DIAGNOSIS — G43.909: ICD-10-CM

## 2021-04-01 DIAGNOSIS — N18.4: ICD-10-CM

## 2021-04-01 DIAGNOSIS — Z90.710: ICD-10-CM

## 2021-04-01 DIAGNOSIS — Z51.5: ICD-10-CM

## 2021-04-01 DIAGNOSIS — M19.90: ICD-10-CM

## 2021-04-01 DIAGNOSIS — Z66: ICD-10-CM

## 2021-04-01 DIAGNOSIS — I13.0: ICD-10-CM

## 2021-04-01 DIAGNOSIS — R06.00: Primary | ICD-10-CM

## 2021-04-01 DIAGNOSIS — Z86.73: ICD-10-CM

## 2021-04-01 PROCEDURE — C1751 CATH, INF, PER/CENT/MIDLINE: HCPCS

## 2021-04-01 RX ADMIN — LORAZEPAM PRN MG: 2 LIQUID ORAL at 23:27

## 2021-04-01 NOTE — CRLCT
INDICATION:



Hypoxia. 



COMPARISON:



CT of the chest from 02/22/2021 



TECHNIQUE:



CT examination of the chest was performed without contrast enhancement. 3 

mm thick axial sections were obtained from above the apices of the lungs to 

the lung bases. 



Please note that all CT scans at this facility use dose modulation, 

iterative reconstruction, and/or weight-based dosing when appropriate to 

reduce radiation dose to as low as reasonably achievable. 



FINDINGS:



The previously seen pleural effusions have increased in size. They are now 

moderate in size, remaining larger on the right than the left. There is new 

mild dependant atelectasis in the posterior lower lungs adjacent to the 

effusions. 



The lungs are otherwise clear.



The previously seen curvilinear density in the superior portion of the 

right major fissure is no longer evident, but there is significant motion 

in this region of the scan, so in small area of density could be missed. 



During the interval, a left subclavian infusion port has been placed with 

its tip in the superior vena cava at the cavoatrial junction. 



There is no sign of mediastinal or hilar mass or adenopathy. Sensitivity is 

limited by lack of contrast enhancement.



Again seen is heavy calcification of the aortic valve annulus. There is 

stable moderate triple-vessel coronary calcification. There is a new mild 

pericardial effusion. 



There is no sign of aneurysmal dilatation of the thoracic aorta. There is 

heavy calcification of the aortic arch and descending thoracic aorta. The 

ascending great vessels show heavy calcification but are otherwise normal 

in appearance. 



There is no sign of supraclavicular or axillary mass or adenopathy. 



There is new mild ascites. 



The visualized superior liver has a 1.5 centimeter probable cyst in the 

dome of the posterior segments of the right lobe, segment 7. A 1.5 

centimeter low-density region is seen in the anterior subcapsular anterior 

segment of the right lobe in between segments 5 and 8. There is no change 

in the 1.3 centimeter in diameter low-density region in the medial 

subcapsular posterior inferior right lower lobe, segment 6. 



There continues to be mild nodularity of the surface of the liver, 

suggesting cirrhosis. 



The visualized superior spleen, pancreas, kidneys, and adrenals are normal 

in appearance. 



The osseous structures are normal in appearance for the patient`s age.



IMPRESSION:



Increased pleural effusions bilaterally, now moderate, remaining larger on 

the right than the left. 



New mild dependant atelectasis in the posterior lung bases adjacent to the 

effusions, right greater than left. 



New mild pericardial effusion. 



New mild ascites. 



The liver continues to have slight nodularity of its margins suggesting 

cirrhosis. Stable appearance of multiple low-density regions in the liver 

consistent with cysts.



Please note that all CT scans at this facility use dose modulation, 

iterative reconstruction, and/or weight-based dosing when appropriate to 

reduce radiation dose to as low as reasonably achievable.



Dictated by Huang Arriaza MD @ Apr 1 2021 10:47PM



Signed by Dr. Huang Arriaza @ Apr 1 2021 11:00PM

## 2021-04-01 NOTE — EDM.PDOC
ED HPI GENERAL MEDICAL PROBLEM





- General


Chief Complaint: Respiratory Problem


Stated Complaint: MEDICAL VIA NORTH


Time Seen by Provider: 04/01/21 18:11


Source of Information: Reports: Patient, EMS, Family





- History of Present Illness


INITIAL COMMENTS - FREE TEXT/NARRATIVE: 


Marlee is an 80-year-old female presenting to the ER via [facility] for 

evaluation of acute onset of dyspnea, shaking chills, and tachycardia.  Patient 

has a history significant for stage IV pancreatic cancer and recently had 

chemotherapy placed on hold due to renal failure, neutropenia, and congestive 

heart failure.  Today she was seen in the clinic at which time she received a 

COVID-19 vaccine.  This occurred around noon today.  Around 5 this evening, the 

patient became acutely more short of breath, tachycardic, weak, with shaking 

rigors.  He is afebrile on arrival with a heart rate in the 120s bpm.








- Related Data


                                    Allergies











Allergy/AdvReac Type Severity Reaction Status Date / Time


 


oxaprozin [Oxaprozin] Allergy Unknown Cannot Verified 04/01/21 18:26





   Remember  


 


Sulfa (Sulfonamide Allergy  Rash Verified 04/01/21 18:26





Antibiotics)     











Home Meds: 


                                    Home Meds





Levothyroxine Sodium [Synthroid] 75 mcg PO DAILY 10/01/14 [History]


Rosuvastatin [Crestor] 40 mg PO BEDTIME 10/01/14 [History]


amLODIPine Besylate [Amlodipine Besylate] 10 mg PO DAILY 10/01/14 [History]


Omeprazole [Prilosec] 20 mg PO DAILY 03/02/15 [History]


Acetaminophen 650 mg PO Q6HR PRN 12/11/20 [History]


Cyanocobalamin (Vitamin B-12) [Vitamin B-12] 100 mcg PO DAILY 12/11/20 [History]


Docusate Sodium [Colace] 100 mg PO BID PRN 12/11/20 [History]


Metoprolol Tartrate [Lopressor] 50 mg PO BID 12/11/20 [History]


bisacodyL [Dulcolax] 5 mg PO DAILY PRN 12/11/20 [History]


Aspirin [Halfprin] 81 mg PO DAILY 03/24/21 [History]


Clopidogrel [Plavix] 75 mg PO DAILY 03/24/21 [History]


Furosemide [Lasix] 20 mg PO DAILY 03/24/21 [History]


Hydrocodone/Acetaminophen [Hydrocodon-Acetaminophen 5-325] 1 each PO Q6HR PRN 

03/24/21 [History]


Prochlorperazine [Compazine] 10 mg PO Q6HR PRN 03/24/21 [History]


glipiZIDE [Glucotrol] 5 mg PO DAILY 03/24/21 [History]


Cholecalciferol (Vitamin D3) [Vitamin D3] 1,000 unit PO DAILY 04/01/21 [History]











Past Medical History


HEENT History: Reports: Impaired Vision


Cardiovascular History: Reports: High Cholesterol, Hypertension


Gastrointestinal History: Reports: GERD


Genitourinary History: Reports: Chronic Renal Insuffiency, Renal Calculus


OB/GYN History: Reports: Pregnancy


Other OB/GYN History: HYSTERECTOMY


Musculoskeletal History: Reports: Arthritis


Neurological History: Reports: CVA, Migraines


Endocrine/Metabolic History: Reports: Diabetes, Type II, Hypothyroidism


Hematologic History: Reports: Blood Transfusion(s)


Other Hematologic History: BILAT LE DVT


Immunologic History: Reports: Immunosuppression, Other (See Below)


Other Immunologic History: chemo for pancreatic cancer


Oncologic (Cancer) History: Reports: Pancreatic





- Infectious Disease History


Infectious Disease History: Reports: Chicken Pox, Measles, Mumps





- Past Surgical History


Other HEENT Surgeries/Procedures: PREVIOUS CVA 2002


Cardiovascular Surgical History: Reports: None


GI Surgical History: Reports: Colonoscopy


Female  Surgical History: Reports: Hysterectomy, Salpingo-Oophorectomy


Neurological Surgical History: Reports: None


Musculoskeletal Surgical History: Reports: Knee Replacement





Social & Family History





- Family History


Family Medical History: No Pertinent Family History





- Caffeine Use


Caffeine Use: Reports: Coffee





ED ROS GENERAL





- Review of Systems


Review Of Systems: See Below


Constitutional: Reports: Chills, Weakness, Fatigue


HEENT: Reports: No Symptoms


Respiratory: Reports: Shortness of Breath, Wheezing


Cardiovascular: Reports: No Symptoms


Endocrine: Reports: No Symptoms


GI/Abdominal: Reports: No Symptoms


: Reports: No Symptoms


Musculoskeletal: Reports: No Symptoms


Skin: Reports: No Symptoms


Neurological: Reports: Tremors


Psychiatric: Reports: No Symptoms


Hematologic/Lymphatic: Reports: No Symptoms


Immunologic: Reports: No Symptoms





ED EXAM, GENERAL





- Physical Exam


Exam: See Below


Exam Limited By: No Limitations


General Appearance: Alert, Anxious, Moderate Distress


Eye Exam: Bilateral Eye: EOMI, PERRL


Nose: Normal Inspection


Throat/Mouth: Normal Inspection


Head: Atraumatic, Normocephalic


Neck: Normal Inspection, Supple, Non-Tender, Full Range of Motion.  No: Carotid 

Bruit


Respiratory/Chest: Decreased Breath Sounds (Bilateral diminished sounds 

especially in the right base.  Scant wheezes are appreciated and are 

predominantly expiratory.), Wheezing, Accessory Muscle Use, Retractions


Cardiovascular: Normal Peripheral Pulses, Regular Rate, Rhythm, No JVD, No 

Murmur, Tachycardia


Peripheral Pulses: 2+: Radial (L), Radial (R), Posterior Tibial (L), Posterior 

Tibial (R)


GI/Abdominal: Normal Bowel Sounds, Soft, Non-Tender


Back Exam: Normal Inspection, Full Range of Motion


Extremities: Normal Range of Motion, Non-Tender, Pedal Edema (1-2+ edema 

bilateral lower extremities from the knee to the foot)


Neurological: Alert, Oriented, Normal Cognition, No Motor/Sensory Deficits


Psychiatric: Anxious


Skin Exam: Warm, Dry


Lymphatic: No Adenopathy


  ** #1 Interpretation


EKG Date: 04/01/21


Time: 18:23


Rhythm: NSR


Rate (Beats/Min): 123


Axis: Normal


P-Wave: Present


QRS: Normal


ST-T: Other (Flattened T waves in leads II, III, aVF and V4 through V6.)


QT: Normal


Comparison: Change From Previous EKG (Sinus tachycardia with multiple premature 

ventricular and atrial beats replaces normal sinus rhythm on previous EKG 

11/13/2014.)





Course





- Vital Signs


Last Recorded V/S: 


                                Last Vital Signs











Temp  36.5 C   04/01/21 18:18


 


Pulse  120 H  04/01/21 18:39


 


Resp  37 H  04/01/21 18:39


 


BP  169/128 H  04/01/21 18:39


 


Pulse Ox  93 L  04/01/21 18:39














- Orders/Labs/Meds


Orders: 


                               Active Orders 24 hr











 Category Date Time Status


 


 EKG Documentation Completion [RC] ASDIRECTED Care  04/01/21 18:15 Active


 


 RT Aerosol Therapy [RC] ASDIRECTED Care  04/01/21 18:12 Active


 


 Chest 1V Frontal [CR] Stat Exams  04/01/21 18:12 Taken


 


 EKG 12 Lead [EK] Routine Ther  04/01/21 18:14 Ordered











Labs: 


                                Laboratory Tests











  04/01/21 04/01/21 Range/Units





  18:35 18:35 


 


WBC  11.8 H   (4.5-11.0)  K/uL


 


RBC  3.18 L   (3.30-5.50)  M/uL


 


Hgb  9.6 L   (12.0-15.0)  g/dL


 


Hct  30.3 L   (36.0-48.0)  %


 


MCV  95   (80-98)  fL


 


MCH  30   (27-31)  pg


 


MCHC  32   (32-36)  %


 


Plt Count  260   (150-400)  K/uL


 


Neut % (Auto)  86 H   (36-66)  %


 


Lymph % (Auto)  9 L   (24-44)  %


 


Mono % (Auto)  5   (2-6)  %


 


Eos % (Auto)  1 L   (2-4)  %


 


Baso % (Auto)  0   (0-1)  %


 


Sodium   148  (140-148)  mmol/L


 


Potassium   4.5  (3.6-5.2)  mmol/L


 


Chloride   109 H  (100-108)  mmol/L


 


Carbon Dioxide   19 L  (21-32)  mmol/L


 


Anion Gap   24.5 H  (5.0-14.0)  mmol/L


 


BUN   21 H  (7-18)  mg/dL


 


Creatinine   2.4 H  (0.6-1.0)  mg/dL


 


Est Cr Clr Drug Dosing   15.21  mL/min


 


Estimated GFR (MDRD)   19 L  (>60)  


 


Glucose   225 H  ()  mg/dL


 


Calcium   8.7  (8.5-10.1)  mg/dL


 


Total Bilirubin   0.4  (0.2-1.0)  mg/dL


 


AST   42 H  (15-37)  U/L


 


ALT   15  (12-78)  U/L


 


Alkaline Phosphatase   176 H  ()  U/L


 


Troponin I   0.020  (0.000-0.056)  ng/mL


 


NT-Pro-B Natriuret Pep   39022 H  (5-450)  pg/mL


 


Total Protein   6.4  (6.4-8.2)  g/dL


 


Albumin   2.7 L  (3.4-5.0)  g/dL


 


Globulin   3.7 H  (2.3-3.5)  g/dL


 


Albumin/Globulin Ratio   0.7 L  (1.2-2.2)  











Meds: 


Medications














Discontinued Medications














Generic Name Dose Route Start Last Admin





  Trade Name Freq  PRN Reason Stop Dose Admin


 


Albuterol/Ipratropium  3 ml  04/01/21 18:12  04/01/21 18:32





  Albuterol/Ipratropium 3.0-0.5 Mg/3 Ml Neb Soln  NEB  04/01/21 18:13  3 ml





  ONETIME ONE   Administration


 


Furosemide  40 mg  04/01/21 19:20 





  Furosemide 40 Mg/4 Ml Vial  IVPUSH  04/01/21 19:21 





  ONETIME ONE  


 


Ondansetron HCl  4 mg  04/01/21 19:19 





  Ondansetron 4 Mg/2 Ml Sdv  IVPUSH  04/01/21 19:20 





  ONETIME ONE  














- Radiology Interpretation


Free Text/Narrative:: 


Large right pleural effusion.  Normal cardiac silhouette.  No significant 

infiltrates.








- Re-Assessments/Exams


Free Text/Narrative Re-Assessment/Exam: 





04/01/21 19:30 I reviewed the patient's chest x-ray showing a newly developed 

right pleural effusion.  There is no sign for any infiltrates.  I reviewed the 

patient's labs showing a slight leukocytosis at 11.8 which is not unusual after 

receiving the COVID-19 vaccine today.  What is surprising as her creatinine is 

risen to 2.4 and her pro B type natruretic peptide is 16,628 fine significant el

evation.  This in combination with her acute dyspnea, peripheral edema, and 

tachycardia are suggestive of significant congestive heart failure.  The patient

 did improve after getting the DuoNeb, however, she is still tachypneic and 

tachycardic and will likely need admission for diuresis.  The patient was 

initiated with Lasix 40 mg IV push.  We will have to watch kidney function.  The

 patient is DNR/DNI.  I discussed the case with Dr. Dumont who will arrange for

 admission of the patient.








Departure





- Departure


Time of Disposition: 19:32


Disposition: Admitted As Inpatient 65


Clinical Impression: 


 Acute dyspnea, Chronic renal failure, stage 3a, Malignant neoplasm of pancreas 

metastatic to intra-abdominal lymph node





Congestive heart failure (CHF)


Qualifiers:


 Heart failure type: unspecified Heart failure chronicity: acute on chronic 

Qualified Code(s): I50.9 - Heart failure, unspecified








- Discharge Information


Referrals: 


Tejas Oconnor MD [Primary Care Provider] - 


Forms:  ED Department Discharge





Sepsis Event Note (ED)





- Focused Exam


Vital Signs: 


                                   Vital Signs











  Temp Pulse Resp BP Pulse Ox


 


 04/01/21 18:39   120 H  37 H  169/128 H  93 L


 


 04/01/21 18:18  36.5 C  133 H  42 H  166/72 H  94 L














- Problem List & Annotations


(1) Pancreatic carcinoma


SNOMED Code(s): 779772047


   Code(s): C25.9 - MALIGNANT NEOPLASM OF PANCREAS, UNSPECIFIED   Status: Acute 

 Priority: High   Current Visit: No   





(2) Acute dyspnea


SNOMED Code(s): 227762667, 360671712


   Code(s): R06.00 - DYSPNEA, UNSPECIFIED   Status: Acute   Priority: High   

Current Visit: Yes   





(3) Chronic renal failure, stage 3a


SNOMED Code(s): 480145029, 989645847


   Code(s): N18.31 - CHRONIC KIDNEY DISEASE, STAGE 3A   Status: Chronic   

Priority: High   Current Visit: Yes   





(4) Congestive heart failure (CHF)


SNOMED Code(s): 42493303


   Code(s): I50.9 - HEART FAILURE, UNSPECIFIED   Status: Acute   Priority: High 

 Current Visit: Yes   


Qualifiers: 


   Heart failure type: unspecified   Heart failure chronicity: acute on chronic 

 Qualified Code(s): I50.9 - Heart failure, unspecified   





- Problem List Review


Problem List Initiated/Reviewed/Updated: Yes





- My Orders


Last 24 Hours: 


My Active Orders





04/01/21 18:12


RT Aerosol Therapy [RC] ASDIRECTED 


Chest 1V Frontal [CR] Stat 





04/01/21 18:14


EKG 12 Lead [EK] Routine 





04/01/21 18:15


EKG Documentation Completion [RC] ASDIRECTED 














- Assessment/Plan


Last 24 Hours: 


My Active Orders





04/01/21 18:12


RT Aerosol Therapy [RC] ASDIRECTED 


Chest 1V Frontal [CR] Stat 





04/01/21 18:14


EKG 12 Lead [EK] Routine 





04/01/21 18:15


EKG Documentation Completion [RC] ASDIRECTED

## 2021-04-01 NOTE — PCM.HP.2
H&P History of Present Illness





- General


Date of Service: 04/01/21


Source of Information: Patient, Family, Old Records, Provider, RN Notes Reviewed


History Limitations: Reports: No Limitations





- History of Present Illness


Initial Comments - Free Text/Narative: 





Ms. Alvarenga is an 80-year-old woman who was admitted through the emergency 

department with increased shortness of breath and hypoxia secondary to probable 

pneumonia and underlying stage IV metastatic pancreatic carcinoma and chronic 

kidney disease.  She was hospitalized here about 2 weeks ago with pancytopenia 

secondary to recent chemotherapy and bone marrow suppression.  She was 

transfused 2 units of red blood cells and 1 unit of platelets.  Since then 

chemotherapy has been held because of bone marrow suppression and worsening 

renal function.  She has not felt well with very poor appetite, nausea, and 

progressive shortness of breath.  During this period of time she has become 

progressively more weak and fatigues very easily.  Shortness of breath is 

progressed to the point that she could becomes very short of breath with minimal

exertion.  On evaluation in the emergency department she is noted to have fever 

with temperature elevation of 101.7 degrees.  White blood cell count is mildly 

elevated and chest x-ray shows evidence of infiltrate versus fluid right lung 

base.  She has received a dose of furosemide 40 mg IV while in the emergency 

department.  Would like to be treated for current problem but also like to Start

palliative care for symptomatic management of her pain, nausea, and dyspnea.





- Related Data


Allergies/Adverse Reactions: 


                                    Allergies











Allergy/AdvReac Type Severity Reaction Status Date / Time


 


oxaprozin [Oxaprozin] Allergy Unknown Cannot Verified 04/01/21 18:26





   Remember  


 


Sulfa (Sulfonamide Allergy  Rash Verified 04/01/21 18:26





Antibiotics)     











Home Medications: 


                                    Home Meds





Levothyroxine Sodium [Synthroid] 75 mcg PO DAILY 10/01/14 [History]


Rosuvastatin [Crestor] 40 mg PO BEDTIME 10/01/14 [History]


amLODIPine Besylate [Amlodipine Besylate] 10 mg PO DAILY 10/01/14 [History]


Omeprazole [Prilosec] 20 mg PO DAILY 03/02/15 [History]


Acetaminophen 650 mg PO Q6HR PRN 12/11/20 [History]


Cyanocobalamin (Vitamin B-12) [Vitamin B-12] 100 mcg PO DAILY 12/11/20 [History]


Docusate Sodium [Colace] 100 mg PO BID PRN 12/11/20 [History]


Metoprolol Tartrate [Lopressor] 50 mg PO BID 12/11/20 [History]


bisacodyL [Dulcolax] 5 mg PO DAILY PRN 12/11/20 [History]


Aspirin [Halfprin] 81 mg PO DAILY 03/24/21 [History]


Clopidogrel [Plavix] 75 mg PO DAILY 03/24/21 [History]


Furosemide [Lasix] 20 mg PO DAILY 03/24/21 [History]


Hydrocodone/Acetaminophen [Hydrocodon-Acetaminophen 5-325] 1 each PO Q6HR PRN 

03/24/21 [History]


Prochlorperazine [Compazine] 10 mg PO Q6HR PRN 03/24/21 [History]


glipiZIDE [Glucotrol] 5 mg PO DAILY 03/24/21 [History]


Cholecalciferol (Vitamin D3) [Vitamin D3] 1,000 unit PO DAILY 04/01/21 [History]











Past Medical History


HEENT History: Reports: Impaired Vision


Cardiovascular History: Reports: Heart Failure, High Cholesterol, Hypertension


Gastrointestinal History: Reports: GERD


Genitourinary History: Reports: Chronic Renal Insuffiency, Renal Calculus


OB/GYN History: Reports: Pregnancy


Other OB/BYN History: HYSTERECTOMY


Musculoskeletal History: Reports: Arthritis


Neurological History: Reports: CVA, Migraines


Endocrine/Metabolic History: Reports: Diabetes, Type II, Hypothyroidism


Hematologic History: Reports: Blood Transfusion(s)


Other Hematologic History: BILAT LE DVT


Immunologic History: Reports: Immunosuppression, Other (See Below)


Other Immunologic History: chemo for pancreatic cancer


Oncologic (Cancer) History: Reports: Pancreatic





- Infectious Disease History


Infectious Disease History: Reports: Chicken Pox, Measles, Mumps





- Past Surgical History


Other HEENT Surgeries/Procedures: PREVIOUS CVA 2002


Cardiovascular Surgical History: Reports: None


GI Surgical History: Reports: Colonoscopy


Female  Surgical History: Reports: Hysterectomy, Salpingo-Oophorectomy


Neurological Surgical History: Reports: None


Musculoskeletal Surgical History: Reports: Knee Replacement





Social & Family History





- Family History


Family Medical History: No Pertinent Family History





- Tobacco Use


Tobacco Use Status *Q: Former Tobacco User


Used Tobacco, but Quit: Yes


Month/Year Tobacco Last Used: 20 years





- Caffeine Use


Caffeine Use: Reports: Coffee





- Recreational Drug Use


Recreational Drug Use: No





H&P Review of Systems





- Review of Systems:


Review Of Systems: See Below


General: Reports: Fever, Malaise, Weakness, Fatigue, Decreased Appetite.  Denies

: Chills


HEENT: Reports: No Symptoms


Pulmonary: Reports: Shortness of Breath, Cough.  Denies: Wheezing, Pleuritic 

Chest Pain, Sputum, Hemoptysis


Cardiovascular: Reports: Dyspnea on Exertion, Edema.  Denies: Chest Pain, Palpit

ations, Orthopnea, PND, Lightheadedness


Gastrointestinal: Reports: Decreased Appetite, Nausea, Vomiting.  Denies: 

Constipation, Diarrhea, Difficulty Swallowing, Distension, Hematemesis, 

Hematochezia, Melena


Genitourinary: Reports: No Symptoms


Musculoskeletal: Reports: No Symptoms


Skin: Reports: No Symptoms


Psychiatric: Reports: No Symptoms


Neurological: Reports: No Symptoms


Hematologic/Lymphatic: Reports: No Symptoms


Immunologic: Reports: No Symptoms





Exam





- Exam


Exam: See Below





- Vital Signs


Vital Signs: 


                                Last Vital Signs











Temp  101.7 F H  04/01/21 19:36


 


Pulse  105 H  04/01/21 19:36


 


Resp  34 H  04/01/21 19:36


 


BP  151/67 H  04/01/21 19:36


 


Pulse Ox  92 L  04/01/21 19:36











Weight: 140 lb





- Exam


Quality Assessment: Supplemental Oxygen, DVT Prophylaxis


General: Alert, Oriented, Cooperative, Moderate Distress


HEENT: Conjunctiva Clear, Hearing Intact, Normal Nasal Septum, Posterior Pharynx

 Clear, Pupils Equal.  No: Mucosa Moist & Pink


Neck: Supple, Trachea Midline, +2 Carotid Pulse wo Bruit


Lungs: Normal Respiratory Effort, Decreased Breath Sounds, Rales, Rhonchi.  No: 

Wheezing


Cardiovascular: Regular Rhythm, Normal S1, Normal S2, Tachycardia.  No: Systolic

 Murmur, Diastolic Murmur


GI/Abdominal Exam: Soft, Non-Tender, No Organomegaly, No Distention


Back Exam: Normal Inspection, Full Range of Motion


Extremities: Non-Tender, Pedal Edema


Skin: Warm, Dry, Intact


Neurological: Cranial Nerves Intact, Strength Equal Bilateral, Normal Speech, 

Normal Tone, Sensation Intact.  No: Focal Deficit


Neuro Extensive - Mental Status: Alert, Oriented x3, Normal Mood/Affect, Normal 

Cognition, Memory Intact





- Patient Data


Lab Results Last 24 hrs: 


                         Laboratory Results - last 24 hr











  04/01/21 04/01/21 Range/Units





  18:35 18:35 


 


WBC  11.8 H   (4.5-11.0)  K/uL


 


RBC  3.18 L   (3.30-5.50)  M/uL


 


Hgb  9.6 L   (12.0-15.0)  g/dL


 


Hct  30.3 L   (36.0-48.0)  %


 


MCV  95   (80-98)  fL


 


MCH  30   (27-31)  pg


 


MCHC  32   (32-36)  %


 


Plt Count  260   (150-400)  K/uL


 


Neut % (Auto)  86 H   (36-66)  %


 


Lymph % (Auto)  9 L   (24-44)  %


 


Mono % (Auto)  5   (2-6)  %


 


Eos % (Auto)  1 L   (2-4)  %


 


Baso % (Auto)  0   (0-1)  %


 


Sodium   148  (140-148)  mmol/L


 


Potassium   4.5  (3.6-5.2)  mmol/L


 


Chloride   109 H  (100-108)  mmol/L


 


Carbon Dioxide   19 L  (21-32)  mmol/L


 


Anion Gap   24.5 H  (5.0-14.0)  mmol/L


 


BUN   21 H  (7-18)  mg/dL


 


Creatinine   2.4 H  (0.6-1.0)  mg/dL


 


Est Cr Clr Drug Dosing   15.21  mL/min


 


Estimated GFR (MDRD)   19 L  (>60)  


 


Glucose   225 H  ()  mg/dL


 


Calcium   8.7  (8.5-10.1)  mg/dL


 


Total Bilirubin   0.4  (0.2-1.0)  mg/dL


 


AST   42 H  (15-37)  U/L


 


ALT   15  (12-78)  U/L


 


Alkaline Phosphatase   176 H  ()  U/L


 


Troponin I   0.020  (0.000-0.056)  ng/mL


 


NT-Pro-B Natriuret Pep   29615 H  (5-450)  pg/mL


 


Total Protein   6.4  (6.4-8.2)  g/dL


 


Albumin   2.7 L  (3.4-5.0)  g/dL


 


Globulin   3.7 H  (2.3-3.5)  g/dL


 


Albumin/Globulin Ratio   0.7 L  (1.2-2.2)  











Result Diagrams: 


                                 04/01/21 18:35





                                 04/01/21 18:35





Sepsis Event Note





- Evaluation


Sepsis Screening Result: No Definite Risk





- Focused Exam


Vital Signs: 


                                   Vital Signs











  Temp Pulse Resp BP Pulse Ox


 


 04/01/21 19:36  101.7 F H  105 H  34 H  151/67 H  92 L


 


 04/01/21 18:39   120 H  37 H  169/128 H  93 L


 


 04/01/21 18:18  97.7 F  133 H  42 H  166/72 H  94 L














*Q Meaningful Use (ADM)





- VTE Risk Assess *Q


Each Risk Factor Represents 1 Point: Swollen Legs, Current, Serious lung disease

 including pneumonia


Total Score 1 Point Risk Factors: 2


Each Risk Factor Represents 2 Points: Malignancy (present or previous)


Total Score 2 Point Risk Factors: 2


Each Risk Factor Represents 3 Points: Age 75 Years or Greater


Total Score 3 Point Risk Factors: 3


Each Risk Factor Represents 5 Points: None


Total Score 5 Point Risk Factors: 0


Venous Thromboembolism Risk Factor Score *Q: 7


Problem List Initiated/Reviewed/Updated: Yes


Orders Last 24hrs: 


                               Active Orders 24 hr











 Category Date Time Status


 


 EKG Documentation Completion [RC] ASDIRECTED Care  04/01/21 18:15 Active


 


 RT Aerosol Therapy [RC] ASDIRECTED Care  04/01/21 18:12 Active


 


 Chest 1V Frontal [CR] Stat Exams  04/01/21 18:12 Taken


 


 BLOOD GAS ARTERIAL [BG] Stat Lab  04/01/21 20:15 Ordered


 


 CULTURE BLOOD [BC] Stat Lab  04/01/21 20:14 Ordered


 


 CULTURE BLOOD [BC] Stat Lab  04/01/21 20:14 Ordered


 


 LACTIC ACID [CHEM] Stat Lab  04/01/21 20:14 Ordered


 


 PROCALCITONIN [CHEM] Stat Lab  04/01/21 20:14 Ordered


 


 UA W/MICROSCOPIC [URIN] Stat Lab  04/01/21 19:41 Ordered


 


 Blood Culture x2 Reflex Set [OM.PC] Urgent Oth  04/01/21 20:14 Ordered


 


 EKG 12 Lead [EK] Routine Ther  04/01/21 18:14 Ordered











Assessment/Plan Comment:: 





ASSESSMENT AND PLAN





HYPOXIC RESPIRATORY FAILURE-very short of breath with minimal exertion and noted

 to have hypoxia on initial presentation.  She currently is on 4 L of oxygen per

 minute via nasal cannula with adequate oxygenation.  White blood cell count is 

elevated and she was noted to have fever in the emergency department.  Chest x-

ray shows possible infiltrate versus fluid right lung base.  Pulmonary emboli 

would also be in the current differential, unable to evaluate with CT scan and 

contrast because of renal insufficiency.


-CT scan of chest without contrast


-Lactic acid, procalcitonin pending


-Blood cultures pending


-Empiric IV antibiotic therapy with ceftriaxone and doxycycline, pending culture

 results and further evaluation


-Nebulized albuterol


-Echocardiogram when available


-Supplemental oxygen as needed





STAGE IV PANCREATIC CARCINOMA-she had been receiving palliative chemotherapy.  

Chemotherapy currently on hold because of bone marrow suppression and pr

ogressive renal insufficiency.





CHRONIC KIDNEY DISEASE STAGE IIIb-creatinine has increased now to 2.4.  Possibly

 secondary to dehydration and intravascular volume depletion, as well as recent 

chemotherapy


-Closely monitor urine output and renal function


-Reassess labs in a.m.





PALLIATIVE CARE-she would like to start management for comfort, in addition to 

current interventions


-Morphine 2.5 mg every hour as needed for pain and/or dyspnea


-Lorazepam 0.25 mg every 2 hours as needed for nausea and/or anxiety





MAINTENANCE ISSUES


-DVT prophylaxis; Lovenox 30 mg subcu daily


-GI prophylaxis; continue outpatient PPI therapy


-Hinojosa catheter; not indicated


-Nutrition; regular diet


-Nicotine dependence; not required





CODE STATUS-DNR/DNI





ADMISSION STATUS-this patient will be admitted to observation status, expect no 

more than a one night hospital stay for evaluation and management of problems as

 outlined above.





DISPOSITION-anticipate discharge to home after the hospital stay.





PRIMARY CARE PROVIDER-Dr. Oconnor





- Mortality Measure


Prognosis:: Poor

## 2021-04-02 RX ADMIN — ALBUTEROL SULFATE PRN MG: 2.5 SOLUTION RESPIRATORY (INHALATION) at 20:30

## 2021-04-02 RX ADMIN — LORAZEPAM PRN MG: 2 LIQUID ORAL at 20:23

## 2021-04-02 RX ADMIN — ALBUTEROL SULFATE PRN MG: 2.5 SOLUTION RESPIRATORY (INHALATION) at 05:15

## 2021-04-02 RX ADMIN — ALBUTEROL SULFATE PRN MG: 2.5 SOLUTION RESPIRATORY (INHALATION) at 08:34

## 2021-04-02 NOTE — PCM.PN
- General Info


Date of Service: 04/02/21


Subjective Update: 





Ms. Alvarenga is felt improved since admission with less shortness of breath.  She 

has had no significant temperature elevation and vital signs have stabilized.  

Respiratory rate is decreased and heart rate has come down as well.


Functional Status: Reports: Tolerating Diet, Ambulating, Urinating





- Review of Systems


General: Reports: Weakness, Fatigue.  Denies: Fever, Chills


Pulmonary: Reports: Shortness of Breath.  Denies: Pleuritic Chest Pain, Cough, 

Sputum, Hemoptysis, Wheezing


Cardiovascular: Reports: Dyspnea on Exertion.  Denies: Chest Pain, Palpitations,

Orthopnea, PND, Edema, Lightheadedness


Gastrointestinal: Reports: No Symptoms





- Patient Data


Vitals - Most Recent: 


                                Last Vital Signs











Temp  98.1 F   04/02/21 08:00


 


Pulse  88   04/02/21 08:45


 


Resp  29 H  04/02/21 08:00


 


BP  125/53 L  04/02/21 08:45


 


Pulse Ox  92 L  04/02/21 08:00











Weight - Most Recent: 146 lb 12.8 oz


I&O - Last 24 Hours: 


                                 Intake & Output











 04/01/21 04/02/21 04/02/21





 22:59 06:59 14:59


 


Intake Total   360


 


Output Total  300 


 


Balance  -300 360











Lab Results Last 24 Hours: 


                         Laboratory Results - last 24 hr











  04/01/21 04/01/21 04/01/21 Range/Units





  18:35 18:35 19:41 


 


WBC  11.8 H    (4.5-11.0)  K/uL


 


RBC  3.18 L    (3.30-5.50)  M/uL


 


Hgb  9.6 L    (12.0-15.0)  g/dL


 


Hct  30.3 L    (36.0-48.0)  %


 


MCV  95    (80-98)  fL


 


MCH  30    (27-31)  pg


 


MCHC  32    (32-36)  %


 


Plt Count  260    (150-400)  K/uL


 


Neut % (Auto)  86 H    (36-66)  %


 


Lymph % (Auto)  9 L    (24-44)  %


 


Mono % (Auto)  5    (2-6)  %


 


Eos % (Auto)  1 L    (2-4)  %


 


Baso % (Auto)  0    (0-1)  %


 


Puncture Site     


 


ABG pH     (7.350-7.450)  


 


ABG pCO2     (35.0-42.0)  mmHg


 


ABG pO2     (75.0-100.0)  mmHg


 


ABG HCO3     (22.0-26.0)  mmol/L


 


ABG Total CO2     (21.0-25.0)  mmol/L


 


ABG O2 Saturation     (95.0-98.0)  %


 


ABG O2 Content     (15.0-23.0)  %vol


 


ABG Base Excess     mm/L


 


ABG Hemoglobin     (12.0-16.0)  g/dL


 


ABG Oxyhemoglobin     %


 


ABG Carboxyhemoglobin     (0.0-1.6)  %


 


ABG Methemoglobin     %


 


Sylvain Test     


 


O2 Delivery Device     


 


Oxygen Flow Rate     L


 


Sodium   148   (140-148)  mmol/L


 


Potassium   4.5   (3.6-5.2)  mmol/L


 


Chloride   109 H   (100-108)  mmol/L


 


Carbon Dioxide   19 L   (21-32)  mmol/L


 


Anion Gap   24.5 H   (5.0-14.0)  mmol/L


 


BUN   21 H   (7-18)  mg/dL


 


Creatinine   2.4 H   (0.6-1.0)  mg/dL


 


Est Cr Clr Drug Dosing   15.21   mL/min


 


Estimated GFR (MDRD)   19 L   (>60)  


 


Glucose   225 H   ()  mg/dL


 


Lactic Acid     (0.4-2.0)  mmol/L


 


Calcium   8.7   (8.5-10.1)  mg/dL


 


Magnesium     (1.8-2.4)  mg/dL


 


Total Bilirubin   0.4   (0.2-1.0)  mg/dL


 


AST   42 H   (15-37)  U/L


 


ALT   15   (12-78)  U/L


 


Alkaline Phosphatase   176 H   ()  U/L


 


Troponin I   0.020   (0.000-0.056)  ng/mL


 


NT-Pro-B Natriuret Pep   72790 H   (5-450)  pg/mL


 


Total Protein   6.4   (6.4-8.2)  g/dL


 


Albumin   2.7 L   (3.4-5.0)  g/dL


 


Globulin   3.7 H   (2.3-3.5)  g/dL


 


Albumin/Globulin Ratio   0.7 L   (1.2-2.2)  


 


Procalcitonin     ng/mL


 


Urine Color    Yellow  (YELLOW)  


 


Urine Appearance    Cloudy A  (CLEAR)  


 


Urine pH    6.0  (5.0-8.0)  


 


Ur Specific Gravity    >= 1.030  (1.008-1.030)  


 


Urine Protein    100 H  (NEGATIVE)  mg/dL


 


Urine Glucose (UA)    Negative  (NEGATIVE)  mg/dL


 


Urine Ketones    Negative  (NEGATIVE)  mg/dL


 


Urine Occult Blood    Moderate H  (NEGATIVE)  


 


Urine Nitrite    Negative  (NEGATIVE)  


 


Urine Bilirubin    Negative  (NEGATIVE)  


 


Urine Urobilinogen    0.2  (0.2-1.0)  EU/dL


 


Ur Leukocyte Esterase    Negative  (NEGATIVE)  


 


Urine RBC    0-5  (0-5)  


 


Urine WBC    0-5  (0-5)  


 


Ur Epithelial Cells    Few  


 


Amorphous Sediment    Few  


 


Urine Bacteria    Many  


 


Urine Mucus    Not seen  














  04/01/21 04/01/21 04/01/21 Range/Units





  20:15 20:34 20:34 


 


WBC     (4.5-11.0)  K/uL


 


RBC     (3.30-5.50)  M/uL


 


Hgb     (12.0-15.0)  g/dL


 


Hct     (36.0-48.0)  %


 


MCV     (80-98)  fL


 


MCH     (27-31)  pg


 


MCHC     (32-36)  %


 


Plt Count     (150-400)  K/uL


 


Neut % (Auto)     (36-66)  %


 


Lymph % (Auto)     (24-44)  %


 


Mono % (Auto)     (2-6)  %


 


Eos % (Auto)     (2-4)  %


 


Baso % (Auto)     (0-1)  %


 


Puncture Site  Lt.radial    


 


ABG pH  7.408    (7.350-7.450)  


 


ABG pCO2  29.8 L    (35.0-42.0)  mmHg


 


ABG pO2  63.7 L    (75.0-100.0)  mmHg


 


ABG HCO3  18.4 L    (22.0-26.0)  mmol/L


 


ABG Total CO2  17.3 L    (21.0-25.0)  mmol/L


 


ABG O2 Saturation  92.1 L    (95.0-98.0)  %


 


ABG O2 Content  11.6 L    (15.0-23.0)  %vol


 


ABG Base Excess  -5.0    mm/L


 


ABG Hemoglobin  9.1 L    (12.0-16.0)  g/dL


 


ABG Oxyhemoglobin  90.1    %


 


ABG Carboxyhemoglobin  1.0    (0.0-1.6)  %


 


ABG Methemoglobin  1.2    %


 


Sylvain Test  Passed    


 


O2 Delivery Device  Nasal cannula    


 


Oxygen Flow Rate  4.0    L


 


Sodium     (140-148)  mmol/L


 


Potassium     (3.6-5.2)  mmol/L


 


Chloride     (100-108)  mmol/L


 


Carbon Dioxide     (21-32)  mmol/L


 


Anion Gap     (5.0-14.0)  mmol/L


 


BUN     (7-18)  mg/dL


 


Creatinine     (0.6-1.0)  mg/dL


 


Est Cr Clr Drug Dosing     mL/min


 


Estimated GFR (MDRD)     (>60)  


 


Glucose     ()  mg/dL


 


Lactic Acid   3.3 H   (0.4-2.0)  mmol/L


 


Calcium     (8.5-10.1)  mg/dL


 


Magnesium     (1.8-2.4)  mg/dL


 


Total Bilirubin     (0.2-1.0)  mg/dL


 


AST     (15-37)  U/L


 


ALT     (12-78)  U/L


 


Alkaline Phosphatase     ()  U/L


 


Troponin I     (0.000-0.056)  ng/mL


 


NT-Pro-B Natriuret Pep     (5-450)  pg/mL


 


Total Protein     (6.4-8.2)  g/dL


 


Albumin     (3.4-5.0)  g/dL


 


Globulin     (2.3-3.5)  g/dL


 


Albumin/Globulin Ratio     (1.2-2.2)  


 


Procalcitonin    2.00  ng/mL


 


Urine Color     (YELLOW)  


 


Urine Appearance     (CLEAR)  


 


Urine pH     (5.0-8.0)  


 


Ur Specific Gravity     (1.008-1.030)  


 


Urine Protein     (NEGATIVE)  mg/dL


 


Urine Glucose (UA)     (NEGATIVE)  mg/dL


 


Urine Ketones     (NEGATIVE)  mg/dL


 


Urine Occult Blood     (NEGATIVE)  


 


Urine Nitrite     (NEGATIVE)  


 


Urine Bilirubin     (NEGATIVE)  


 


Urine Urobilinogen     (0.2-1.0)  EU/dL


 


Ur Leukocyte Esterase     (NEGATIVE)  


 


Urine RBC     (0-5)  


 


Urine WBC     (0-5)  


 


Ur Epithelial Cells     


 


Amorphous Sediment     


 


Urine Bacteria     


 


Urine Mucus     














  04/02/21 04/02/21 04/02/21 Range/Units





  05:12 05:12 08:29 


 


WBC  13.5 H    (4.5-11.0)  K/uL


 


RBC  2.60 L    (3.30-5.50)  M/uL


 


Hgb  7.9 L    (12.0-15.0)  g/dL


 


Hct  24.9 L    (36.0-48.0)  %


 


MCV  96    (80-98)  fL


 


MCH  30    (27-31)  pg


 


MCHC  32    (32-36)  %


 


Plt Count  173    (150-400)  K/uL


 


Neut % (Auto)  85 H    (36-66)  %


 


Lymph % (Auto)  3 L    (24-44)  %


 


Mono % (Auto)  12 H    (2-6)  %


 


Eos % (Auto)  0 L    (2-4)  %


 


Baso % (Auto)  0    (0-1)  %


 


Puncture Site     


 


ABG pH     (7.350-7.450)  


 


ABG pCO2     (35.0-42.0)  mmHg


 


ABG pO2     (75.0-100.0)  mmHg


 


ABG HCO3     (22.0-26.0)  mmol/L


 


ABG Total CO2     (21.0-25.0)  mmol/L


 


ABG O2 Saturation     (95.0-98.0)  %


 


ABG O2 Content     (15.0-23.0)  %vol


 


ABG Base Excess     mm/L


 


ABG Hemoglobin     (12.0-16.0)  g/dL


 


ABG Oxyhemoglobin     %


 


ABG Carboxyhemoglobin     (0.0-1.6)  %


 


ABG Methemoglobin     %


 


Sylvain Test     


 


O2 Delivery Device     


 


Oxygen Flow Rate     L


 


Sodium   147   (140-148)  mmol/L


 


Potassium   4.3   (3.6-5.2)  mmol/L


 


Chloride   110 H   (100-108)  mmol/L


 


Carbon Dioxide   21   (21-32)  mmol/L


 


Anion Gap   20.3 H   (5.0-14.0)  mmol/L


 


BUN   21 H   (7-18)  mg/dL


 


Creatinine   2.4 H   (0.6-1.0)  mg/dL


 


Est Cr Clr Drug Dosing   15.21   mL/min


 


Estimated GFR (MDRD)   19 L   (>60)  


 


Glucose   172 H   ()  mg/dL


 


Lactic Acid    2.2 H  (0.4-2.0)  mmol/L


 


Calcium   8.6   (8.5-10.1)  mg/dL


 


Magnesium   1.8   (1.8-2.4)  mg/dL


 


Total Bilirubin     (0.2-1.0)  mg/dL


 


AST     (15-37)  U/L


 


ALT     (12-78)  U/L


 


Alkaline Phosphatase     ()  U/L


 


Troponin I     (0.000-0.056)  ng/mL


 


NT-Pro-B Natriuret Pep     (5-450)  pg/mL


 


Total Protein     (6.4-8.2)  g/dL


 


Albumin     (3.4-5.0)  g/dL


 


Globulin     (2.3-3.5)  g/dL


 


Albumin/Globulin Ratio     (1.2-2.2)  


 


Procalcitonin     ng/mL


 


Urine Color     (YELLOW)  


 


Urine Appearance     (CLEAR)  


 


Urine pH     (5.0-8.0)  


 


Ur Specific Gravity     (1.008-1.030)  


 


Urine Protein     (NEGATIVE)  mg/dL


 


Urine Glucose (UA)     (NEGATIVE)  mg/dL


 


Urine Ketones     (NEGATIVE)  mg/dL


 


Urine Occult Blood     (NEGATIVE)  


 


Urine Nitrite     (NEGATIVE)  


 


Urine Bilirubin     (NEGATIVE)  


 


Urine Urobilinogen     (0.2-1.0)  EU/dL


 


Ur Leukocyte Esterase     (NEGATIVE)  


 


Urine RBC     (0-5)  


 


Urine WBC     (0-5)  


 


Ur Epithelial Cells     


 


Amorphous Sediment     


 


Urine Bacteria     


 


Urine Mucus     











Med Orders - Current: 


                               Current Medications





Acetaminophen (Acetaminophen 325 Mg Tab)  650 mg PO Q4H PRN


   PRN Reason: Pain (Mild 1-3)/fever


   Last Admin: 04/02/21 05:09 Dose:  650 mg


   Documented by: 


Albuterol (Albuterol 0.083% 2.5 Mg/3 Ml Neb Soln)  2.5 mg NEB Q4H PRN


   PRN Reason: Shortness Of Breath/wheezing


   Last Admin: 04/02/21 08:34 Dose:  2.5 mg


   Documented by: 


Aspirin (Aspirin 81 Mg Tab.Ec)  81 mg PO DAILY [facility]


   Last Admin: 04/02/21 08:45 Dose:  81 mg


   Documented by: 


Bisacodyl (Bisacodyl 5 Mg Tab)  5 mg PO DAILY PRN


   PRN Reason: Constipation


Clopidogrel Bisulfate (Clopidogrel 75 Mg Tab)  75 mg PO DAILY [facility]


   Last Admin: 04/02/21 08:45 Dose:  75 mg


   Documented by: 


Docusate Sodium (Docusate Sodium 100 Mg Cap)  100 mg PO BID PRN


   PRN Reason: Constipation


Enoxaparin Sodium (Enoxaparin 30 Mg/0.3 Ml Syringe)  30 mg SUBCUT BEDTIME [facility]


   Last Admin: 04/01/21 23:29 Dose:  30 mg


   Documented by: 


Ceftriaxone Sodium 1 gm/ (Sodium Chloride)  50 mls @ 100 mls/hr IV Q24H [facility]


   Last Admin: 04/01/21 23:28 Dose:  100 mls/hr


   Documented by: 


Doxycycline Hyclate 100 mg/ (Sodium Chloride)  100 mls @ 100 mls/hr IV Q12H [facility]


   Last Admin: 04/01/21 23:31 Dose:  100 mls/hr


   Documented by: 


Levothyroxine Sodium (Levothyroxine 25 Mcg Tab)  75 mcg PO ACBREAKFAST [facility]


   Last Admin: 04/02/21 08:11 Dose:  75 mcg


   Documented by: 


Lorazepam (Lorazepam Oral Concentrate 1mg/0.5ml U/D)  0.25 mg PO Q2H PRN


   PRN Reason: Nausea


   Last Admin: 04/01/21 23:27 Dose:  0.25 mg


   Documented by: 


Metoprolol Tartrate (Metoprolol Tartrate 50 Mg Tab)  50 mg PO BID [facility]


   Last Admin: 04/02/21 08:45 Dose:  50 mg


   Documented by: 


Morphine Sulfate (Morphine 10 Mg/0.5 Ml Oral Syringe)  2.5 mg PO Q1H PRN


   PRN Reason: Dyspnea


Ondansetron HCl (Ondansetron 4 Mg/2 Ml Sdv)  4 mg IV Q4H PRN


   PRN Reason: Nausea/Vomiting


Pantoprazole Sodium (Pantoprazole 40 Mg Tab.Cr)  40 mg PO ACBREAKFAST [facility]


   Last Admin: 04/02/21 08:10 Dose:  40 mg


   Documented by: 


Polyethylene Glycol (Polyethylene Glycol 3350 Powder 17 Gm Packet)  17 gm PO 

DAILY PRN


   PRN Reason: Constipation


Sodium Chloride (Sodium Chloride 0.9% 10 Ml Syringe)  10 ml FLUSH ASDIRECTED PRN


   PRN Reason: Keep Vein Open





Discontinued Medications





Albuterol/Ipratropium (Albuterol/Ipratropium 3.0-0.5 Mg/3 Ml Neb Soln)  3 ml NEB

ONETIME ONE


   Stop: 04/01/21 18:13


   Last Admin: 04/01/21 18:32 Dose:  3 ml


   Documented by: 


Furosemide (Furosemide 40 Mg/4 Ml Vial)  40 mg IVPUSH ONETIME ONE


   Stop: 04/01/21 19:21


   Last Admin: 04/01/21 19:43 Dose:  40 mg


   Documented by: 


Furosemide (Furosemide 20 Mg/2 Ml Vial)  60 mg IVPUSH NOW ONE


   Stop: 04/02/21 08:31


   Last Admin: 04/02/21 09:47 Dose:  60 mg


   Documented by: 


Ondansetron HCl (Ondansetron 4 Mg/2 Ml Sdv)  4 mg IVPUSH ONETIME ONE


   Stop: 04/01/21 19:20


   Last Admin: 04/01/21 19:43 Dose:  4 mg


   Documented by: 











- Exam


Quality Assessment: DVT Prophylaxis


General: Alert, Oriented, Cooperative, Mild Distress


Lungs: Clear to Auscultation, Normal Respiratory Effort, Decreased Breath Sounds


Cardiovascular: Regular Rate, Regular Rhythm, No Murmurs


GI/Abdominal Exam: Soft, Non-Tender, No Organomegaly, No Distention


Extremities: Non-Tender, No Pedal Edema





- Patient Data


Lab Results Last 24 hrs: 


                         Laboratory Results - last 24 hr











  04/01/21 04/01/21 04/01/21 Range/Units





  18:35 18:35 19:41 


 


WBC  11.8 H    (4.5-11.0)  K/uL


 


RBC  3.18 L    (3.30-5.50)  M/uL


 


Hgb  9.6 L    (12.0-15.0)  g/dL


 


Hct  30.3 L    (36.0-48.0)  %


 


MCV  95    (80-98)  fL


 


MCH  30    (27-31)  pg


 


MCHC  32    (32-36)  %


 


Plt Count  260    (150-400)  K/uL


 


Neut % (Auto)  86 H    (36-66)  %


 


Lymph % (Auto)  9 L    (24-44)  %


 


Mono % (Auto)  5    (2-6)  %


 


Eos % (Auto)  1 L    (2-4)  %


 


Baso % (Auto)  0    (0-1)  %


 


Puncture Site     


 


ABG pH     (7.350-7.450)  


 


ABG pCO2     (35.0-42.0)  mmHg


 


ABG pO2     (75.0-100.0)  mmHg


 


ABG HCO3     (22.0-26.0)  mmol/L


 


ABG Total CO2     (21.0-25.0)  mmol/L


 


ABG O2 Saturation     (95.0-98.0)  %


 


ABG O2 Content     (15.0-23.0)  %vol


 


ABG Base Excess     mm/L


 


ABG Hemoglobin     (12.0-16.0)  g/dL


 


ABG Oxyhemoglobin     %


 


ABG Carboxyhemoglobin     (0.0-1.6)  %


 


ABG Methemoglobin     %


 


Sylvain Test     


 


O2 Delivery Device     


 


Oxygen Flow Rate     L


 


Sodium   148   (140-148)  mmol/L


 


Potassium   4.5   (3.6-5.2)  mmol/L


 


Chloride   109 H   (100-108)  mmol/L


 


Carbon Dioxide   19 L   (21-32)  mmol/L


 


Anion Gap   24.5 H   (5.0-14.0)  mmol/L


 


BUN   21 H   (7-18)  mg/dL


 


Creatinine   2.4 H   (0.6-1.0)  mg/dL


 


Est Cr Clr Drug Dosing   15.21   mL/min


 


Estimated GFR (MDRD)   19 L   (>60)  


 


Glucose   225 H   ()  mg/dL


 


Lactic Acid     (0.4-2.0)  mmol/L


 


Calcium   8.7   (8.5-10.1)  mg/dL


 


Magnesium     (1.8-2.4)  mg/dL


 


Total Bilirubin   0.4   (0.2-1.0)  mg/dL


 


AST   42 H   (15-37)  U/L


 


ALT   15   (12-78)  U/L


 


Alkaline Phosphatase   176 H   ()  U/L


 


Troponin I   0.020   (0.000-0.056)  ng/mL


 


NT-Pro-B Natriuret Pep   07106 H   (5-450)  pg/mL


 


Total Protein   6.4   (6.4-8.2)  g/dL


 


Albumin   2.7 L   (3.4-5.0)  g/dL


 


Globulin   3.7 H   (2.3-3.5)  g/dL


 


Albumin/Globulin Ratio   0.7 L   (1.2-2.2)  


 


Procalcitonin     ng/mL


 


Urine Color    Yellow  (YELLOW)  


 


Urine Appearance    Cloudy A  (CLEAR)  


 


Urine pH    6.0  (5.0-8.0)  


 


Ur Specific Gravity    >= 1.030  (1.008-1.030)  


 


Urine Protein    100 H  (NEGATIVE)  mg/dL


 


Urine Glucose (UA)    Negative  (NEGATIVE)  mg/dL


 


Urine Ketones    Negative  (NEGATIVE)  mg/dL


 


Urine Occult Blood    Moderate H  (NEGATIVE)  


 


Urine Nitrite    Negative  (NEGATIVE)  


 


Urine Bilirubin    Negative  (NEGATIVE)  


 


Urine Urobilinogen    0.2  (0.2-1.0)  EU/dL


 


Ur Leukocyte Esterase    Negative  (NEGATIVE)  


 


Urine RBC    0-5  (0-5)  


 


Urine WBC    0-5  (0-5)  


 


Ur Epithelial Cells    Few  


 


Amorphous Sediment    Few  


 


Urine Bacteria    Many  


 


Urine Mucus    Not seen  














  04/01/21 04/01/21 04/01/21 Range/Units





  20:15 20:34 20:34 


 


WBC     (4.5-11.0)  K/uL


 


RBC     (3.30-5.50)  M/uL


 


Hgb     (12.0-15.0)  g/dL


 


Hct     (36.0-48.0)  %


 


MCV     (80-98)  fL


 


MCH     (27-31)  pg


 


MCHC     (32-36)  %


 


Plt Count     (150-400)  K/uL


 


Neut % (Auto)     (36-66)  %


 


Lymph % (Auto)     (24-44)  %


 


Mono % (Auto)     (2-6)  %


 


Eos % (Auto)     (2-4)  %


 


Baso % (Auto)     (0-1)  %


 


Puncture Site  Lt.radial    


 


ABG pH  7.408    (7.350-7.450)  


 


ABG pCO2  29.8 L    (35.0-42.0)  mmHg


 


ABG pO2  63.7 L    (75.0-100.0)  mmHg


 


ABG HCO3  18.4 L    (22.0-26.0)  mmol/L


 


ABG Total CO2  17.3 L    (21.0-25.0)  mmol/L


 


ABG O2 Saturation  92.1 L    (95.0-98.0)  %


 


ABG O2 Content  11.6 L    (15.0-23.0)  %vol


 


ABG Base Excess  -5.0    mm/L


 


ABG Hemoglobin  9.1 L    (12.0-16.0)  g/dL


 


ABG Oxyhemoglobin  90.1    %


 


ABG Carboxyhemoglobin  1.0    (0.0-1.6)  %


 


ABG Methemoglobin  1.2    %


 


Sylvain Test  Passed    


 


O2 Delivery Device  Nasal cannula    


 


Oxygen Flow Rate  4.0    L


 


Sodium     (140-148)  mmol/L


 


Potassium     (3.6-5.2)  mmol/L


 


Chloride     (100-108)  mmol/L


 


Carbon Dioxide     (21-32)  mmol/L


 


Anion Gap     (5.0-14.0)  mmol/L


 


BUN     (7-18)  mg/dL


 


Creatinine     (0.6-1.0)  mg/dL


 


Est Cr Clr Drug Dosing     mL/min


 


Estimated GFR (MDRD)     (>60)  


 


Glucose     ()  mg/dL


 


Lactic Acid   3.3 H   (0.4-2.0)  mmol/L


 


Calcium     (8.5-10.1)  mg/dL


 


Magnesium     (1.8-2.4)  mg/dL


 


Total Bilirubin     (0.2-1.0)  mg/dL


 


AST     (15-37)  U/L


 


ALT     (12-78)  U/L


 


Alkaline Phosphatase     ()  U/L


 


Troponin I     (0.000-0.056)  ng/mL


 


NT-Pro-B Natriuret Pep     (5-450)  pg/mL


 


Total Protein     (6.4-8.2)  g/dL


 


Albumin     (3.4-5.0)  g/dL


 


Globulin     (2.3-3.5)  g/dL


 


Albumin/Globulin Ratio     (1.2-2.2)  


 


Procalcitonin    2.00  ng/mL


 


Urine Color     (YELLOW)  


 


Urine Appearance     (CLEAR)  


 


Urine pH     (5.0-8.0)  


 


Ur Specific Gravity     (1.008-1.030)  


 


Urine Protein     (NEGATIVE)  mg/dL


 


Urine Glucose (UA)     (NEGATIVE)  mg/dL


 


Urine Ketones     (NEGATIVE)  mg/dL


 


Urine Occult Blood     (NEGATIVE)  


 


Urine Nitrite     (NEGATIVE)  


 


Urine Bilirubin     (NEGATIVE)  


 


Urine Urobilinogen     (0.2-1.0)  EU/dL


 


Ur Leukocyte Esterase     (NEGATIVE)  


 


Urine RBC     (0-5)  


 


Urine WBC     (0-5)  


 


Ur Epithelial Cells     


 


Amorphous Sediment     


 


Urine Bacteria     


 


Urine Mucus     














  04/02/21 04/02/21 04/02/21 Range/Units





  05:12 05:12 08:29 


 


WBC  13.5 H    (4.5-11.0)  K/uL


 


RBC  2.60 L    (3.30-5.50)  M/uL


 


Hgb  7.9 L    (12.0-15.0)  g/dL


 


Hct  24.9 L    (36.0-48.0)  %


 


MCV  96    (80-98)  fL


 


MCH  30    (27-31)  pg


 


MCHC  32    (32-36)  %


 


Plt Count  173    (150-400)  K/uL


 


Neut % (Auto)  85 H    (36-66)  %


 


Lymph % (Auto)  3 L    (24-44)  %


 


Mono % (Auto)  12 H    (2-6)  %


 


Eos % (Auto)  0 L    (2-4)  %


 


Baso % (Auto)  0    (0-1)  %


 


Puncture Site     


 


ABG pH     (7.350-7.450)  


 


ABG pCO2     (35.0-42.0)  mmHg


 


ABG pO2     (75.0-100.0)  mmHg


 


ABG HCO3     (22.0-26.0)  mmol/L


 


ABG Total CO2     (21.0-25.0)  mmol/L


 


ABG O2 Saturation     (95.0-98.0)  %


 


ABG O2 Content     (15.0-23.0)  %vol


 


ABG Base Excess     mm/L


 


ABG Hemoglobin     (12.0-16.0)  g/dL


 


ABG Oxyhemoglobin     %


 


ABG Carboxyhemoglobin     (0.0-1.6)  %


 


ABG Methemoglobin     %


 


Sylvain Test     


 


O2 Delivery Device     


 


Oxygen Flow Rate     L


 


Sodium   147   (140-148)  mmol/L


 


Potassium   4.3   (3.6-5.2)  mmol/L


 


Chloride   110 H   (100-108)  mmol/L


 


Carbon Dioxide   21   (21-32)  mmol/L


 


Anion Gap   20.3 H   (5.0-14.0)  mmol/L


 


BUN   21 H   (7-18)  mg/dL


 


Creatinine   2.4 H   (0.6-1.0)  mg/dL


 


Est Cr Clr Drug Dosing   15.21   mL/min


 


Estimated GFR (MDRD)   19 L   (>60)  


 


Glucose   172 H   ()  mg/dL


 


Lactic Acid    2.2 H  (0.4-2.0)  mmol/L


 


Calcium   8.6   (8.5-10.1)  mg/dL


 


Magnesium   1.8   (1.8-2.4)  mg/dL


 


Total Bilirubin     (0.2-1.0)  mg/dL


 


AST     (15-37)  U/L


 


ALT     (12-78)  U/L


 


Alkaline Phosphatase     ()  U/L


 


Troponin I     (0.000-0.056)  ng/mL


 


NT-Pro-B Natriuret Pep     (5-450)  pg/mL


 


Total Protein     (6.4-8.2)  g/dL


 


Albumin     (3.4-5.0)  g/dL


 


Globulin     (2.3-3.5)  g/dL


 


Albumin/Globulin Ratio     (1.2-2.2)  


 


Procalcitonin     ng/mL


 


Urine Color     (YELLOW)  


 


Urine Appearance     (CLEAR)  


 


Urine pH     (5.0-8.0)  


 


Ur Specific Gravity     (1.008-1.030)  


 


Urine Protein     (NEGATIVE)  mg/dL


 


Urine Glucose (UA)     (NEGATIVE)  mg/dL


 


Urine Ketones     (NEGATIVE)  mg/dL


 


Urine Occult Blood     (NEGATIVE)  


 


Urine Nitrite     (NEGATIVE)  


 


Urine Bilirubin     (NEGATIVE)  


 


Urine Urobilinogen     (0.2-1.0)  EU/dL


 


Ur Leukocyte Esterase     (NEGATIVE)  


 


Urine RBC     (0-5)  


 


Urine WBC     (0-5)  


 


Ur Epithelial Cells     


 


Amorphous Sediment     


 


Urine Bacteria     


 


Urine Mucus     











Result Diagrams: 


                                 04/02/21 05:12





                                 04/02/21 05:12





Sepsis Event Note





- Evaluation


Sepsis Screening Result: Severe Sepsis Risk





- Focused Exam


Vital Signs: 


                                   Vital Signs











  Temp Pulse Pulse Resp BP BP Pulse Ox


 


 04/02/21 08:45   88    125/53 L  


 


 04/02/21 08:00  98.1 F   86  29 H   125/53 L  92 L


 


 04/02/21 07:24        98


 


 04/02/21 04:00  100.1 F   100  25 H   112/45 L  94 L


 


 04/02/21 02:00    93  24 H   123/66  91 L


 


 04/02/21 00:00  99.1 F   99  25 H   129/63  90 L


 


 04/01/21 23:00    97  24 H   135/54 L  93 L


 


 04/01/21 22:59        94 L














  Pulse Ox


 


 04/02/21 08:45 


 


 04/02/21 08:00 


 


 04/02/21 07:24 


 


 04/02/21 04:00 


 


 04/02/21 02:00 


 


 04/02/21 00:00 


 


 04/01/21 23:00 


 


 04/01/21 22:59  93 L














- Problem List Review


Problem List Initiated/Reviewed/Updated: Yes





- My Orders


Last 24 Hours: 


My Active Orders





04/01/21 Dinner


Regular Diet [DIET] 





04/01/21 20:14


Blood Culture x2 Reflex Set [OM.PC] Urgent 





04/01/21 20:30


CULTURE BLOOD [BC] Stat 





04/01/21 20:34


CULTURE BLOOD [BC] Stat 





04/01/21 21:18


Resuscitation Status Routine 





04/01/21 21:50


Acetaminophen [TylenoL]   650 mg PO Q4H PRN 


Albuterol [Proventil Neb Soln]   2.5 mg NEB Q4H PRN 


Docusate Sodium [Colace]   100 mg PO BID PRN 


Enoxaparin [Lovenox]   30 mg SUBCUT BEDTIME 


LORazepam [Ativan ORAL Concentrate 1MG/0.5 ML U/D]   0.25 mg PO Q2H PRN 


Morphine [Morphine 10 MG/0.5 ML Oral Syringe]   2.5 mg PO Q1H PRN 


Ondansetron [Zofran]   4 mg IV Q4H PRN 


Sodium Chloride 0.9% [Saline Flush]   10 ml FLUSH ASDIRECTED PRN 


bisacodyL [Dulcolax]   5 mg PO DAILY PRN 


polyethylene glycoL 3350 [MiraLAX]   17 gm PO DAILY PRN 





04/01/21 21:50


Ambulate [RC] QID 


Cardiac Monitoring [RC] .As Directed 


Height and Weight [RC] DAILY 


Intake and Output [RC] QSHIFT 


Notify Provider Vital Signs [RC] ASDIRECTED 


Oxygen Therapy [RC] PRN 


Pulse Oximetry [RC] CONTINUOUS 


RT Aerosol Therapy [RC] ASDIRECTED 


Up to Chair [RC] QID 


VTE/DVT Education [RC] Per Unit Routine 


Vital Signs [RC] Q4H 


Saline Lock Insert [OM.PC] Routine 





04/01/21 22:00


cefTRIAXone [Rocephin] 1 gm   Sodium Chloride 0.9% [Normal Saline] 50 ml IV Q24H







04/01/21 22:30


Doxycycline [Vibramycin] 100 mg   Sodium Chloride 0.9% [Normal Saline] 100 ml IV

Q12H 





04/02/21 07:30


Levothyroxine   75 mcg PO ACBREAKFAST 


Pantoprazole [ProTONIX***]   40 mg PO ACBREAKFAST 





04/02/21 09:00


Aspirin [Halfprin]   81 mg PO DAILY 


Clopidogrel [Plavix]   75 mg PO DAILY 


Metoprolol Tartrate [Lopressor]   50 mg PO BID 





04/02/21 10:43


Patient Status [ADT] Routine 





04/03/21 05:00


BASIC METABOLIC PANEL,BMP [CHEM] Timed 


CBC WITH AUTO DIFF [HEME] Timed 














- Plan


Plan:: 





ASSESSMENT AND PLAN





HYPOXIC RESPIRATORY FAILURE-very short of breath with minimal exertion and noted

 to have hypoxia on initial presentation.  CT scan of the chest shows bilateral 

pleural effusions with atelectasis, no obvious infiltrates.  She has felt 

improved since admission and is currently requiring less supplemental oxygen.  

Question of underlying infection, lactic acid level was found noted to be 

elevated.  Likely secondary to underlying renal insufficiency, no evidence of 

active sepsis at this time.


-Blood cultures pending


-Empiric IV antibiotic therapy with ceftriaxone and doxycycline, pending culture

 results and further evaluation


-Nebulized albuterol


-Echocardiogram when available


-Supplemental oxygen as needed





STAGE IV PANCREATIC CARCINOMA-she had been receiving palliative chemotherapy.  

Chemotherapy currently on hold because of bone marrow suppression and 

progressive renal insufficiency.





CHRONIC KIDNEY DISEASE STAGE IIIb-creatinine has increased now to 2.4.  Possibly

 secondary to dehydration and intravascular volume depletion, as well as recent 

chemotherapy


-Closely monitor urine output and renal function


-Reassess labs in a.m.





PALLIATIVE CARE-she would like to start management for comfort, in addition to 

current interventions


-Morphine 2.5 mg every hour as needed for pain and/or dyspnea


-Lorazepam 0.25 mg every 2 hours as needed for nausea and/or anxiety





MAINTENANCE ISSUES


-DVT prophylaxis; Lovenox 30 mg subcu daily


-GI prophylaxis; continue outpatient PPI therapy


-Hinojosa catheter; not indicated


-Nutrition; regular diet


-Nicotine dependence; not required





CODE STATUS-DNR/DNI





ADMISSION STATUS-this patient will be admitted to observation status, expect no 

more than a one night hospital stay for evaluation and management of problems as

 outlined above.





DISPOSITION-anticipate discharge to home after the hospital stay.





PRIMARY CARE PROVIDER-Dr. Oconnor

## 2021-04-02 NOTE — CR
CHEST: Portable 4/1/2021 at 6:25 PM

 

CLINICAL HISTORY:Dyspnea

 

COMPARISON:CT 2/22/2021

 

FINDINGS:  Patient has a small to moderate right pleural effusion. Heart size

pulmonary vascular normal. There is an Khdcgj-a-Ycar catheter from the left

subclavian approach. Tip is in the superior vena cava. There is some patchy

airspace disease in both lower lobes right greater than left.

 

IMPRESSION: Small-to-moderate right pleural effusion

 

Patchy bibasal airspace disease may be some compressive atelectasis

 

Left subclavian Hzxbmk-a-Vlum catheter

## 2021-04-03 RX ADMIN — HEPARIN SODIUM (PORCINE) LOCK FLUSH IV SOLN 100 UNIT/ML PRN UNITS: 100 SOLUTION at 11:31

## 2021-04-03 NOTE — PCM.PN
- General Info


Date of Service: 04/03/21


Subjective Update: 





Ms. Alvarenga has felt improved from admission with less shortness of breath.  She 

continues to get short of breath with activity.  Vital signs have been stable 

and she has remained afebrile.


Functional Status: Reports: Tolerating Diet, Ambulating, Urinating





- Review of Systems


General: Reports: Weakness, Fatigue.  Denies: Fever, Chills


Pulmonary: Reports: Shortness of Breath, Cough.  Denies: Pleuritic Chest Pain, 

Sputum, Hemoptysis, Wheezing


Cardiovascular: Reports: Dyspnea on Exertion, Edema.  Denies: Chest Pain, 

Palpitations, Orthopnea, PND, Lightheadedness


Gastrointestinal: Reports: No Symptoms


Genitourinary: Reports: No Symptoms





- Patient Data


Vitals - Most Recent: 


                                Last Vital Signs











Temp  98.3 F   04/03/21 07:40


 


Pulse  82   04/03/21 09:03


 


Resp  13   04/03/21 07:40


 


BP  142/66 H  04/03/21 09:03


 


Pulse Ox  94 L  04/03/21 07:40











Weight - Most Recent: 143 lb 9.6 oz


I&O - Last 24 Hours: 


                                 Intake & Output











 04/02/21 04/03/21 04/03/21





 22:59 06:59 14:59


 


Intake Total  150 460


 


Output Total 600 650 150


 


Balance -600 -500 310











Lab Results Last 24 Hours: 


                         Laboratory Results - last 24 hr











  04/03/21 04/03/21 Range/Units





  05:00 05:00 


 


WBC  8.9   (4.5-11.0)  K/uL


 


RBC  2.54 L   (3.30-5.50)  M/uL


 


Hgb  7.7 L   (12.0-15.0)  g/dL


 


Hct  24.5 L   (36.0-48.0)  %


 


MCV  97   (80-98)  fL


 


MCH  30   (27-31)  pg


 


MCHC  31 L   (32-36)  %


 


Plt Count  142 L   (150-400)  K/uL


 


Neut % (Auto)  57   (36-66)  %


 


Lymph % (Auto)  15 L   (24-44)  %


 


Mono % (Auto)  25 H   (2-6)  %


 


Eos % (Auto)  2   (2-4)  %


 


Baso % (Auto)  0   (0-1)  %


 


Sodium   145  (140-148)  mmol/L


 


Potassium   3.6  (3.6-5.2)  mmol/L


 


Chloride   109 H  (100-108)  mmol/L


 


Carbon Dioxide   23  (21-32)  mmol/L


 


Anion Gap   16.6 H  (5.0-14.0)  mmol/L


 


BUN   27 H  (7-18)  mg/dL


 


Creatinine   2.4 H  (0.6-1.0)  mg/dL


 


Est Cr Clr Drug Dosing   15.20  mL/min


 


Estimated GFR (MDRD)   19 L  (>60)  


 


Glucose   76  ()  mg/dL


 


Calcium   8.4 L  (8.5-10.1)  mg/dL











[facility] Results Last 24 Hours: 


                                  Microbiology











 04/01/21 20:30 Aerobic Blood Culture - Preliminary





 Blood - Port-A-Cath    NO GROWTH AFTER 1 DAY





 Anaerobic Blood Culture - Preliminary





    NO GROWTH AFTER 1 DAY


 


 04/01/21 20:34 Aerobic Blood Culture - Preliminary





 Blood - Arm, Left    NO GROWTH AFTER 1 DAY





 Anaerobic Blood Culture - Preliminary





    NO GROWTH AFTER 1 DAY











Med Orders - Current: 


                               Current Medications





Acetaminophen (Acetaminophen 325 Mg Tab)  650 mg PO Q4H PRN


   PRN Reason: Pain (Mild 1-3)/fever


   Last Admin: 04/03/21 08:55 Dose:  650 mg


   Documented by: 


Albuterol (Albuterol 0.083% 2.5 Mg/3 Ml Neb Soln)  2.5 mg NEB Q4H PRN


   PRN Reason: Shortness Of Breath/wheezing


   Last Admin: 04/02/21 20:30 Dose:  2.5 mg


   Documented by: 


Aspirin (Aspirin 81 Mg Tab.Ec)  81 mg PO DAILY [facility]


   Last Admin: 04/03/21 08:57 Dose:  81 mg


   Documented by: 


Bisacodyl (Bisacodyl 5 Mg Tab)  5 mg PO DAILY PRN


   PRN Reason: Constipation


Clopidogrel Bisulfate (Clopidogrel 75 Mg Tab)  75 mg PO DAILY [facility]


   Last Admin: 04/03/21 08:58 Dose:  75 mg


   Documented by: 


Docusate Sodium (Docusate Sodium 100 Mg Cap)  100 mg PO BID PRN


   PRN Reason: Constipation


   Last Admin: 04/02/21 20:27 Dose:  100 mg


   Documented by: 


Enoxaparin Sodium (Enoxaparin 30 Mg/0.3 Ml Syringe)  30 mg SUBCUT BEDTIME [facility]


   Last Admin: 04/02/21 20:30 Dose:  30 mg


   Documented by: 


Heparin Sodium (Porcine) (Heparin Sodium 100 Units/Ml 5 Ml Syringe)  500 units 

FLUSH ASDIRECTED PRN


   PRN Reason: IV Use


Ceftriaxone Sodium 1 gm/ (Sodium Chloride)  50 mls @ 100 mls/hr IV Q24H [facility]


   Last Admin: 04/02/21 21:32 Dose:  100 mls/hr


   Documented by: 


Doxycycline Hyclate 100 mg/ (Sodium Chloride)  100 mls @ 100 mls/hr IV Q12H [facility]


   Last Admin: 04/03/21 10:13 Dose:  100 mls/hr


   Documented by: 


Levothyroxine Sodium (Levothyroxine 25 Mcg Tab)  [facility]


   Last Admin: 04/03/21 07:43 Dose:  75 mcg


   Documented by: 


Lorazepam (Lorazepam Oral Concentrate 1mg/0.5ml U/D)  0.25 mg PO Q2H PRN


   PRN Reason: Nausea


   Last Admin: 04/01/21 23:27 Dose:  0.25 mg


   Documented by: 


Metoprolol Tartrate (Metoprolol Tartrate 50 Mg Tab)  50 mg PO BID [facility]


   Last Admin: 04/03/21 09:03 Dose:  50 mg


   Documented by: 


Morphine Sulfate (Morphine 10 Mg/0.5 Ml Oral Syringe)  2.5 mg PO Q1H PRN


   PRN Reason: Dyspnea


Ondansetron HCl (Ondansetron 4 Mg/2 Ml Sdv)  4 mg IV Q4H PRN


   PRN Reason: Nausea/Vomiting


Pantoprazole Sodium (Pantoprazole 40 Mg Tab.Cr)  40 mg PO ACBREAKFAST [facility]


   Last Admin: 04/03/21 07:43 Dose:  40 mg


   Documented by: 


Polyethylene Glycol (Polyethylene Glycol 3350 Powder 17 Gm Packet)  17 gm PO CHAU

LY PRN


   PRN Reason: Constipation


Sodium Chloride (Sodium Chloride 0.9% 10 Ml Syringe)  10 ml FLUSH ASDIRECTED PRN


   PRN Reason: Keep Vein Open





Discontinued Medications





Albuterol/Ipratropium (Albuterol/Ipratropium 3.0-0.5 Mg/3 Ml Neb Soln)  3 ml NEB

ONETIME ONE


   Stop: 04/01/21 18:13


   Last Admin: 04/01/21 18:32 Dose:  3 ml


   Documented by: 


Furosemide (Furosemide 40 Mg/4 Ml Vial)  40 mg IVPUSH ONETIME ONE


   Stop: 04/01/21 19:21


   Last Admin: 04/01/21 19:43 Dose:  40 mg


   Documented by: 


Furosemide (Furosemide 20 Mg/2 Ml Vial)  60 mg IVPUSH NOW ONE


   Stop: 04/02/21 08:31


   Last Admin: 04/02/21 09:47 Dose:  60 mg


   Documented by: 


Furosemide (Furosemide 20 Mg/2 Ml Vial)  60 mg IVPUSH NOW ONE


   Stop: 04/03/21 09:01


   Last Admin: 04/03/21 08:59 Dose:  60 mg


   Documented by: 


Ondansetron HCl (Ondansetron 4 Mg/2 Ml Sdv)  4 mg IVPUSH ONETIME ONE


   Stop: 04/01/21 19:20


   Last Admin: 04/01/21 19:43 Dose:  4 mg


   Documented by: 











- Exam


Quality Assessment: DVT Prophylaxis


General: Alert, Oriented, Cooperative, Mild Distress


Lungs: Clear to Auscultation, Normal Respiratory Effort, Decreased Breath Sounds


Cardiovascular: Regular Rate, Regular Rhythm, No Murmurs


GI/Abdominal Exam: Soft, Non-Tender, No Organomegaly, No Distention


Extremities: Non-Tender, Pedal Edema





- Patient Data


Lab Results Last 24 hrs: 


                         Laboratory Results - last 24 hr











  04/03/21 04/03/21 Range/Units





  05:00 05:00 


 


WBC  8.9   (4.5-11.0)  K/uL


 


RBC  2.54 L   (3.30-5.50)  M/uL


 


Hgb  7.7 L   (12.0-15.0)  g/dL


 


Hct  24.5 L   (36.0-48.0)  %


 


MCV  97   (80-98)  fL


 


MCH  30   (27-31)  pg


 


MCHC  31 L   (32-36)  %


 


Plt Count  142 L   (150-400)  K/uL


 


Neut % (Auto)  57   (36-66)  %


 


Lymph % (Auto)  15 L   (24-44)  %


 


Mono % (Auto)  25 H   (2-6)  %


 


Eos % (Auto)  2   (2-4)  %


 


Baso % (Auto)  0   (0-1)  %


 


Sodium   145  (140-148)  mmol/L


 


Potassium   3.6  (3.6-5.2)  mmol/L


 


Chloride   109 H  (100-108)  mmol/L


 


Carbon Dioxide   23  (21-32)  mmol/L


 


Anion Gap   16.6 H  (5.0-14.0)  mmol/L


 


BUN   27 H  (7-18)  mg/dL


 


Creatinine   2.4 H  (0.6-1.0)  mg/dL


 


Est Cr Clr Drug Dosing   15.20  mL/min


 


Estimated GFR (MDRD)   19 L  (>60)  


 


Glucose   76  ()  mg/dL


 


Calcium   8.4 L  (8.5-10.1)  mg/dL











Result Diagrams: 


                                 04/03/21 05:00





                                 04/03/21 05:00


Isaias Results Last 24 hrs: 


                                  Microbiology











 04/01/21 20:30 Aerobic Blood Culture - Preliminary





 Blood - Port-A-Cath    NO GROWTH AFTER 1 DAY





 Anaerobic Blood Culture - Preliminary





    NO GROWTH AFTER 1 DAY


 


 04/01/21 20:34 Aerobic Blood Culture - Preliminary





 Blood - Arm, Left    NO GROWTH AFTER 1 DAY





 Anaerobic Blood Culture - Preliminary





    NO GROWTH AFTER 1 DAY














Sepsis Event Note





- Evaluation


Sepsis Screening Result: No Definite Risk





- Focused Exam


Vital Signs: 


                                   Vital Signs











  Temp Pulse Pulse Resp BP BP Pulse Ox


 


 04/03/21 09:03   82    142/66 H  


 


 04/03/21 07:40  98.3 F   82  13   142/66 H  94 L


 


 04/03/21 07:26        94 L


 


 04/03/21 03:23     16   139/49 L  90 L


 


 04/03/21 00:00     18    92 L














- Problem List Review


Problem List Initiated/Reviewed/Updated: Yes





- My Orders


Last 24 Hours: 


My Active Orders





04/02/21 10:43


Patient Status [ADT] Routine 





04/03/21 10:26


Heparin Sodium [Heparin Lock Flush 100 Units/ML]   500 units FLUSH ASDIRECTED 

PRN 





04/04/21 05:00


BASIC METABOLIC PANEL,BMP [CHEM] Timed 


CBC WITH AUTO DIFF [HEME] Timed 














- Plan


Plan:: 





ASSESSMENT AND PLAN





HYPOXIC RESPIRATORY FAILURE-very short of breath with minimal exertion and noted

 to have hypoxia on initial presentation.  CT scan of the chest shows bilateral 

pleural effusions with atelectasis, no obvious infiltrates.  She has felt impr

pavithra since admission and is currently requiring less supplemental oxygen.  She 

has remained afebrile with no evidence of ongoing infection.  Good diuresis 

yesterday with IV furosemide.


-Blood cultures pending


-Discontinue ceftriaxone and doxycycline


-Nebulized albuterol


-Furosemide 60 mg IV today


-Echocardiogram when available


-Supplemental oxygen as needed





STAGE IV PANCREATIC CARCINOMA-she had been receiving palliative chemotherapy.  

Chemotherapy currently on hold because of bone marrow suppression and 

progressive renal insufficiency.





CHRONIC KIDNEY DISEASE STAGE IIIb-creatinine has increased now to 2.4.  Possibly

 secondary to dehydration and intravascular volume depletion, as well as recent 

chemotherapy


-Closely monitor urine output and renal function


-Reassess labs in a.m.





PALLIATIVE CARE-she would like to start management for comfort, in addition to 

current interventions


-Morphine 2.5 mg every hour as needed for pain and/or dyspnea


-Lorazepam 0.25 mg every 2 hours as needed for nausea and/or anxiety





MAINTENANCE ISSUES


-DVT prophylaxis; Lovenox 30 mg subcu daily


-GI prophylaxis; continue outpatient PPI therapy


-Hinojosa catheter; not indicated


-Nutrition; regular diet


-Nicotine dependence; not required





CODE STATUS-DNR/DNI





ADMISSION STATUS-this patient will be admitted to observation status, expect no 

more than a one night hospital stay for evaluation and management of problems as

 outlined above.





DISPOSITION-anticipate discharge to home after the hospital stay.





PRIMARY CARE PROVIDER-Dr. Oconnor

## 2021-04-04 RX ADMIN — HEPARIN SODIUM (PORCINE) LOCK FLUSH IV SOLN 100 UNIT/ML PRN UNITS: 100 SOLUTION at 12:28

## 2021-04-04 RX ADMIN — DEXAMETHASONE SODIUM PHOSPHATE SCH MG: 4 INJECTION, SOLUTION INTRAMUSCULAR; INTRAVENOUS at 18:54

## 2021-04-04 RX ADMIN — HEPARIN SODIUM (PORCINE) LOCK FLUSH IV SOLN 100 UNIT/ML PRN UNITS: 100 SOLUTION at 18:55

## 2021-04-04 RX ADMIN — HEPARIN SODIUM (PORCINE) LOCK FLUSH IV SOLN 100 UNIT/ML PRN UNITS: 100 SOLUTION at 04:33

## 2021-04-04 NOTE — CRLCT
Indication 



Left thigh pain. Stage IV pancreatic cancer.



TECHNIQUE:



Noncontrast CT images were acquired through the lumbar spine.



COMPARISON:



CT chest abdomen pelvis 02/22/2021, MRI lumbar spine 06/30/2008.



FINDINGS:



The lumbar lordosis is preserved. Vertebral heights maintained. No acute 

fracture or spondylolisthesis.



T12-L1: No spinal canal or neural foraminal narrowing.



L1-2: No spinal canal or neural foraminal narrowing.



L2-3: Minimal facet arthropathy. No spinal canal or neural foraminal 

narrowing.



L3-4: Minimal facet arthropathy. No spinal canal or neural foraminal 

narrowing.



L4-5: Shallow posterior disc bulge. Mild bilateral facet arthropathy. No 

spinal canal or neural foraminal narrowing.



L5-S1: Posterior disc bulge. Mild-to-moderate bilateral facet arthropathy. 

No spinal canal narrowing. Minimal bilateral neural foraminal narrowing. 

Prominent lateral osteophytes encroach on the extraforaminal exiting L5 

nerve roots.



Sacroiliac joint degenerative changes.



Incompletely visualized bilateral pleural effusions.



Aortoiliac atherosclerotic calcifications.



IMPRESSION:



1. No acute fracture.



2. Multilevel lumbar spondylosis without spinal canal stenosis.



3. At L5-S1, prominent lateral osteophytes encroach on and potentially 

impinge the bilateral exiting L5 nerve roots in the extraforaminal zones.



4. Incompletely visualized bilateral pleural effusions.



Please note that all CT scans at this facility use dose modulation, 

iterative reconstruction, and/or weight-based dosing when appropriate to 

reduce radiation dose to as low as reasonably achievable.



Dictated by Randall Lomeli MD @ Apr 4 2021 12:40PM



Signed by Dr. Randall Lomeli @ Apr 4 2021 12:45PM

## 2021-04-04 NOTE — PCM.PN
- General Info


Date of Service: 04/04/21


Subjective Update: 





Ms. Alvarenga has unfortunately experienced more left thigh pain over the past 24 

h. She had noted pain in her thigh yesterday but it has become worse and she 

feels that there is some weakness to the point that she has to lift her leg up 

to get out of bed. Once she is sitting on the side of the bed she is able to 

stand up and ambulate without significant limp or obvious weakness. There is no 

tenderness to palpation of the thigh but it definitely hurts when she tries to 

move it especially when lying in bed. She is subjectively feeling somewhat more 

short of breath today, not requiring increased supplemental oxygen.


Functional Status: Reports: Tolerating Diet, Ambulating, Urinating





- Review of Systems


General: Reports: Weakness, Fatigue.  Denies: Fever, Chills


Pulmonary: Reports: Shortness of Breath, Cough.  Denies: Pleuritic Chest Pain, 

Sputum, Hemoptysis, Wheezing


Cardiovascular: Reports: Dyspnea on Exertion.  Denies: Chest Pain, Palpitations,

Orthopnea, PND, Edema, Lightheadedness


Gastrointestinal: Reports: No Symptoms


Musculoskeletal: Reports: Leg Pain (Left thigh pain)


Neurological: Reports: Weakness (Mild to moderate weakness on flexion at the 

left hip)





- Patient Data


Vitals - Most Recent: 


                                Last Vital Signs











Temp  97.8 F   04/04/21 07:00


 


Pulse  86   04/04/21 08:21


 


Resp  20   04/04/21 07:00


 


BP  167/67 H  04/04/21 08:21


 


Pulse Ox  97   04/04/21 08:00











Weight - Most Recent: 134 lb 7.712 oz


I&O - Last 24 Hours: 


                                 Intake & Output











 04/03/21 04/04/21 04/04/21





 22:59 06:59 14:59


 


Intake Total 410  480


 


Output Total 550 425 900


 


Balance -140 -425 -420











Lab Results Last 24 Hours: 


                         Laboratory Results - last 24 hr











  04/04/21 04/04/21 Range/Units





  04:10 04:10 


 


WBC  7.6   (4.5-11.0)  K/uL


 


RBC  2.65 L   (3.30-5.50)  M/uL


 


Hgb  8.1 L   (12.0-15.0)  g/dL


 


Hct  25.5 L   (36.0-48.0)  %


 


MCV  96   (80-98)  fL


 


MCH  31   (27-31)  pg


 


MCHC  32   (32-36)  %


 


Plt Count  134 L   (150-400)  K/uL


 


Neut % (Auto)  46   (36-66)  %


 


Lymph % (Auto)  25   (24-44)  %


 


Mono % (Auto)  26 H   (2-6)  %


 


Eos % (Auto)  2   (2-4)  %


 


Baso % (Auto)  1   (0-1)  %


 


Sodium   150 H  (140-148)  mmol/L


 


Potassium   3.5 L  (3.6-5.2)  mmol/L


 


Chloride   110 H  (100-108)  mmol/L


 


Carbon Dioxide   25  (21-32)  mmol/L


 


Anion Gap   18.5 H  (5.0-14.0)  mmol/L


 


BUN   29 H  (7-18)  mg/dL


 


Creatinine   2.3 H  (0.6-1.0)  mg/dL


 


Est Cr Clr Drug Dosing   15.86  mL/min


 


Estimated GFR (MDRD)   20 L  (>60)  


 


Glucose   80  ()  mg/dL


 


Calcium   8.6  (8.5-10.1)  mg/dL











[facility] Results Last 24 Hours: 


                                  Microbiology











 04/01/21 20:34 Aerobic Blood Culture - Preliminary





 Blood - Arm, Left    NO GROWTH AFTER 2 DAYS





 Anaerobic Blood Culture - Preliminary





    NO GROWTH AFTER 2 DAYS


 


 04/01/21 20:30 Aerobic Blood Culture - Preliminary





 Blood - Port-A-Cath    NO GROWTH AFTER 2 DAYS





 Anaerobic Blood Culture - Preliminary





    NO GROWTH AFTER 2 DAYS











Med Orders - Current: 


                               Current Medications





Acetaminophen (Acetaminophen 325 Mg Tab)  650 mg PO Q4H PRN


   PRN Reason: Pain (Mild 1-3)/fever


   Last Admin: 04/03/21 08:55 Dose:  650 mg


   Documented by: 


Albuterol (Albuterol 0.083% 2.5 Mg/3 Ml Neb Soln)  2.5 mg NEB Q4H PRN


   PRN Reason: Shortness Of Breath/wheezing


   Last Admin: 04/02/21 20:30 Dose:  2.5 mg


   Documented by: 


Aspirin (Aspirin 81 Mg Tab.Ec)  81 mg PO DAILY [facility]


   Last Admin: 04/04/21 08:21 Dose:  81 mg


   Documented by: 


Bisacodyl (Bisacodyl 5 Mg Tab)  5 mg PO DAILY PRN


   PRN Reason: Constipation


Clopidogrel Bisulfate (Clopidogrel 75 Mg Tab)  75 mg PO DAILY [facility]


   Last Admin: 04/04/21 08:22 Dose:  75 mg


   Documented by: 


Docusate Sodium (Docusate Sodium 100 Mg Cap)  100 mg PO BID PRN


   PRN Reason: Constipation


   Last Admin: 04/02/21 20:27 Dose:  100 mg


   Documented by: 


Enoxaparin Sodium (Enoxaparin 30 Mg/0.3 Ml Syringe)  30 mg SUBCUT BEDTIME [facility]


   Last Admin: 04/03/21 20:30 Dose:  30 mg


   Documented by: 


Furosemide (Furosemide 40 Mg/4 Ml Vial)  60 mg IVPUSH NOW ONE


   Stop: 04/04/21 10:53


Heparin Sodium (Porcine) (Heparin Sodium 100 Units/Ml 5 Ml Syringe)  500 units 

FLUSH ASDIRECTED PRN


   PRN Reason: IV Use


   Last Admin: 04/04/21 04:33 Dose:  500 units


   Documented by: 


Levothyroxine Sodium (Levothyroxine 25 Mcg Tab)  75 mcg PO ACBREAKFAST [facility]


   Last Admin: 04/04/21 08:22 Dose:  75 mcg


   Documented by: 


Lorazepam (Lorazepam Oral Concentrate 1mg/0.5ml U/D)  0.25 mg PO Q2H PRN


   PRN Reason: Nausea


   Last Admin: 04/01/21 23:27 Dose:  0.25 mg


   Documented by: 


Metoprolol Tartrate (Metoprolol Tartrate 50 Mg Tab)  50 mg PO BID [facility]


   Last Admin: 04/04/21 08:21 Dose:  50 mg


   Documented by: 


Morphine Sulfate (Morphine 10 Mg/0.5 Ml Oral Syringe)  2.5 mg PO Q1H PRN


   PRN Reason: Dyspnea


Ondansetron HCl (Ondansetron 4 Mg/2 Ml Sdv)  4 mg IV Q4H PRN


   PRN Reason: Nausea/Vomiting


Pantoprazole Sodium (Pantoprazole 40 Mg Tab.Cr)  40 mg PO ACBREAKFAST [facility]


   Last Admin: 04/04/21 08:22 Dose:  40 mg


   Documented by: 


Polyethylene Glycol (Polyethylene Glycol 3350 Powder 17 Gm Packet)  17 gm PO 

DAILY PRN


   PRN Reason: Constipation


Sodium Chloride (Sodium Chloride 0.9% 10 Ml Syringe)  10 ml FLUSH ASDIRECTED PRN


   PRN Reason: Keep Vein Open





Discontinued Medications





Albuterol/Ipratropium (Albuterol/Ipratropium 3.0-0.5 Mg/3 Ml Neb Soln)  3 ml NEB

ONETIME ONE


   Stop: 04/01/21 18:13


   Last Admin: 04/01/21 18:32 Dose:  3 ml


   Documented by: 


Furosemide (Furosemide 40 Mg/4 Ml Vial)  40 mg IVPUSH ONETIME ONE


   Stop: 04/01/21 19:21


   Last Admin: 04/01/21 19:43 Dose:  40 mg


   Documented by: 


Furosemide (Furosemide 20 Mg/2 Ml Vial)  60 mg IVPUSH NOW ONE


   Stop: 04/02/21 08:31


   Last Admin: 04/02/21 09:47 Dose:  60 mg


   Documented by: 


Furosemide (Furosemide 20 Mg/2 Ml Vial)  60 mg IVPUSH NOW ONE


   Stop: 04/03/21 09:01


   Last Admin: 04/03/21 08:59 Dose:  60 mg


   Documented by: 


Ceftriaxone Sodium 1 gm/ (Sodium Chloride)  50 mls @ 100 mls/hr IV Q24H [facility]


   Last Admin: 04/03/21 21:44 Dose:  100 mls/hr


   Documented by: 


Doxycycline Hyclate 100 mg/ (Sodium Chloride)  100 mls @ 100 mls/hr IV Q12H [facility]


   Last Admin: 04/03/21 22:30 Dose:  100 mls/hr


   Documented by: 


Ondansetron HCl (Ondansetron 4 Mg/2 Ml Sdv)  4 mg IVPUSH ONETIME ONE


   Stop: 04/01/21 19:20


   Last Admin: 04/01/21 19:43 Dose:  4 mg


   Documented by: 


Potassium Chloride (Potassium Chloride 20 Meq Tab.Er)  40 meq PO ONETIME ONE


   Stop: 04/04/21 09:01


   Last Admin: 04/04/21 10:11 Dose:  40 meq


   Documented by: 











- Exam


General: Alert, Oriented, Cooperative, Moderate Distress


Lungs: Clear to Auscultation, Normal Respiratory Effort.  No: Crackles, Rales, 

Rhonchi, Wheezing


Cardiovascular: Regular Rate, Regular Rhythm, No Murmurs


GI/Abdominal Exam: Soft, Non-Tender, No Organomegaly, No Distention


Extremities: Non-Tender, No Pedal Edema


Neurological: No: Strength Equal Bilateral (Mild to moderate weakness on flexion

at the left hip, no other weakness identified in either leg.)





- Patient Data


Lab Results Last 24 hrs: 


                         Laboratory Results - last 24 hr











  04/04/21 04/04/21 Range/Units





  04:10 04:10 


 


WBC  7.6   (4.5-11.0)  K/uL


 


RBC  2.65 L   (3.30-5.50)  M/uL


 


Hgb  8.1 L   (12.0-15.0)  g/dL


 


Hct  25.5 L   (36.0-48.0)  %


 


MCV  96   (80-98)  fL


 


MCH  31   (27-31)  pg


 


MCHC  32   (32-36)  %


 


Plt Count  134 L   (150-400)  K/uL


 


Neut % (Auto)  46   (36-66)  %


 


Lymph % (Auto)  25   (24-44)  %


 


Mono % (Auto)  26 H   (2-6)  %


 


Eos % (Auto)  2   (2-4)  %


 


Baso % (Auto)  1   (0-1)  %


 


Sodium   150 H  (140-148)  mmol/L


 


Potassium   3.5 L  (3.6-5.2)  mmol/L


 


Chloride   110 H  (100-108)  mmol/L


 


Carbon Dioxide   25  (21-32)  mmol/L


 


Anion Gap   18.5 H  (5.0-14.0)  mmol/L


 


BUN   29 H  (7-18)  mg/dL


 


Creatinine   2.3 H  (0.6-1.0)  mg/dL


 


Est Cr Clr Drug Dosing   15.86  mL/min


 


Estimated GFR (MDRD)   20 L  (>60)  


 


Glucose   80  ()  mg/dL


 


Calcium   8.6  (8.5-10.1)  mg/dL











Result Diagrams: 


                                 04/04/21 04:10





                                 04/04/21 04:10


[facility] Results Last 24 hrs: 


                                  Microbiology











 04/01/21 20:34 Aerobic Blood Culture - Preliminary





 Blood - Arm, Left    NO GROWTH AFTER 2 DAYS





 Anaerobic Blood Culture - Preliminary





    NO GROWTH AFTER 2 DAYS


 


 04/01/21 20:30 Aerobic Blood Culture - Preliminary





 Blood - Port-A-Cath    NO GROWTH AFTER 2 DAYS





 Anaerobic Blood Culture - Preliminary





    NO GROWTH AFTER 2 DAYS














Sepsis Event Note





- Evaluation


Sepsis Screening Result: No Definite Risk





- Focused Exam


Vital Signs: 


                                   Vital Signs











  Temp Pulse Pulse Resp BP BP Pulse Ox


 


 04/04/21 08:21   86    167/67 H  


 


 04/04/21 08:00       


 


 04/04/21 07:00  97.8 F   86  20   167/67 H  96


 


 04/04/21 03:07  97.1 F   84  20   157/61 H  92 L


 


 04/04/21 00:20        95














  Pulse Ox


 


 04/04/21 08:21 


 


 04/04/21 08:00  97


 


 04/04/21 07:00 


 


 04/04/21 03:07 


 


 04/04/21 00:20 














- Problem List Review


Problem List Initiated/Reviewed/Updated: Yes





- My Orders


Last 24 Hours: 


My Active Orders





04/03/21 10:26


Heparin Sodium [Heparin Lock Flush 100 Units/ML]   500 units FLUSH ASDIRECTED 

PRN 





04/03/21 11:24


Evaluate for Home Oxygen [RT Evaluate for Home Oxygen] [RC] Click to Edit 





04/04/21 10:48


Femur Min 2V Lt [CR] Stat 


Hip Min 2V or 3V Lt [CR] Stat 


Lumbar Spine wo Cont [CT] Stat 





04/04/21 10:52


Furosemide [Lasix]   60 mg IVPUSH NOW ONE 














- Plan


Plan:: 





ASSESSMENT AND PLAN





HYPOXIC RESPIRATORY FAILURE-very short of breath with minimal exertion and noted

 to have hypoxia on initial presentation.  CT scan of the chest shows bilateral 

pleural effusions with atelectasis, no obvious infiltrates.  She has felt 

improved since admission and is currently requiring less supplemental oxygen.  

She has remained afebrile with no evidence of ongoing infection.  Good diuresis 

yesterday with IV furosemide.


-Nebulized albuterol


-Furosemide 60 mg IV today


-Echocardiogram when available


-Supplemental oxygen as needed





STAGE IV PANCREATIC CARCINOMA-she had been receiving palliative chemotherapy.  

Chemotherapy currently on hold because of bone marrow suppression and 

progressive renal insufficiency.





CHRONIC KIDNEY DISEASE STAGE IIIb-creatinine has increased now to 2.4.  Possibly

 secondary to dehydration and intravascular volume depletion, as well as recent 

chemotherapy


-Closely monitor urine output and renal function


-Reassess labs in a.m.





PAIN LEFT THIGH-no tenderness to palpation but there does appear to be mild to 

moderate weakness flexion of the left leg at the hip. No difficulty with 

standing or ambulation.


-X-ray left femur and hip


-CT scan without contrast lumbar spine





PALLIATIVE CARE-she would like to start management for comfort, in addition to 

current interventions


-Morphine 2.5 mg every hour as needed for pain and/or dyspnea


-Lorazepam 0.25 mg every 2 hours as needed for nausea and/or anxiety





MAINTENANCE ISSUES


-DVT prophylaxis; Lovenox 30 mg subcu daily


-GI prophylaxis; continue outpatient PPI therapy


-Hinojosa catheter; not indicated


-Nutrition; regular diet


-Nicotine dependence; not required





CODE STATUS-DNR/DNI





ADMISSION STATUS-this patient will be admitted to observation status, expect no 

more than a one night hospital stay for evaluation and management of problems as

 outlined above.





DISPOSITION-anticipate discharge to home after the hospital stay.





PRIMARY CARE PROVIDER-Dr. Oconnor

## 2021-04-04 NOTE — CRLCR
INDICATION:



New pain. History of pancreatic cancer, stage IV. 



TECHNIQUE:



Femur radiographs 2 views on 4 films 



COMPARISON:



None



FINDINGS:



Bones: Alignment is normal. No acute fractures or aggressive osseous 

lesions seen. Left knee arthroplasty hardware grossly intact. 



Joint spaces: Mild to moderate degenerative changes of the left hip. 



Soft tissues: Vascular stent material posterior to the mid diaphysis of the 

left femur. Calcified atherosclerotic disease involving proximal left 

superficial artery extending to the left pelvis. 



IMPRESSION:



1. No acute osseous injury moving the left femur. No suspicious bony lesion 

identified.



Dictated by Leno Barboza MD @ 4/4/2021 1:26:06 PM



Dictated by: Leno Barboza MD @ 04/04/2021 13:26:17



(Electronically Signed)

## 2021-04-05 VITALS — HEART RATE: 86 BPM | DIASTOLIC BLOOD PRESSURE: 65 MMHG | SYSTOLIC BLOOD PRESSURE: 178 MMHG

## 2021-04-05 RX ADMIN — DEXAMETHASONE SODIUM PHOSPHATE SCH MG: 4 INJECTION, SOLUTION INTRAMUSCULAR; INTRAVENOUS at 10:27

## 2021-04-05 RX ADMIN — HEPARIN SODIUM (PORCINE) LOCK FLUSH IV SOLN 100 UNIT/ML PRN UNITS: 100 SOLUTION at 03:01

## 2021-04-05 RX ADMIN — DEXAMETHASONE SODIUM PHOSPHATE SCH MG: 4 INJECTION, SOLUTION INTRAMUSCULAR; INTRAVENOUS at 03:00

## 2021-04-05 RX ADMIN — HEPARIN SODIUM (PORCINE) LOCK FLUSH IV SOLN 100 UNIT/ML PRN UNITS: 100 SOLUTION at 10:37

## 2021-04-12 NOTE — OR
DATE OF PROCEDURE:  03/29/2021

 

SURGEON:  Steve Marie MD

 

PREOPERATIVE DIAGNOSIS:  Indications for central venous access.

 

POSTOPERATIVE DIAGNOSIS:  Indications for central venous access.

 

PROCEDURE:  Placement of Bard PowerPort via left subclavian vein approach ([id number]).

 

ANESTHESIA:  Local plus IV sedation.

 

INDICATION FOR PROCEDURE:  This is an 80-year-old presenting for port placement for ongoing

chemotherapy.  Plan is to proceed with port placement.  Potential risks including bleeding,

infection, pneumohemothorax, vascular injury, possible problems with the port becoming

infected or occluded were all gone over, and the patient wishes to proceed.

 

DETAILS OF PROCEDURE:  The patient was taken to the operating room and placed in a supine

position.  After IV sedation was administered, the upper chest and neck areas were prepped

and draped and the left subclavian area anesthetized with 1% lidocaine mixed with Marcaine.

The subclavian vein was then accessed and a guidewire passed and manipulated into the

superior vena cava.  Some additional local was then injected and a transverse

infraclavicular incision was made and carried down through the skin and subcutaneous tissue

and through the pectoralis major fascia.  At that plane, behind the pectoralis major fascia,

a port pocket was constructed bluntly.  The Bard port was then assembled and flushed with

heparinized saline and cut such that the catheter tip would lie in the area of the superior

vena cava and right atrial junction.  The port was placed into the pocket, and over the

introducer and peel-away catheter, the Bard port catheter was placed without difficulty.

Good in and outflow were noted through the port.  The port was flushed with heparinized

saline.  Incision closed with some 4-0 Vicryl stitch deep and a 4-0 Vicryl subcuticular

stitch and Steri-Strips applied.  There were no evident complications.  The patient was

taken to the recovery room in satisfactory condition.

 

 

 

 

Steve Marie MD

DD:  04/09/2021 20:36:01

DT:  04/12/2021 13:52:38

Job #:  [id number]

## 2022-02-02 NOTE — [id number]
**Discharge Summary





- Hospital Course


Brief History: 80-year-old female with recent palliative chemotherapy for stage 

IV pancreatic cancer who presented with progressive weakness and dyspnea.  She 

was admitted for management of suspected congestive heart failure with hypoxic 

respiratory failure and possible pneumonia.  She also had acute on chronic 

kidney injury.


Diagnosis: Stroke: No





- Discharge Data


Discharge Date: 04/05/21


Discharge Disposition: Home, [facility]


Condition: Fair





- Referral to Home Health


Date of Face to Face Encounter: 04/05/21


Reason for Homebound Status: Bilateral lower extremity weakness and pain due to 

lumbar radiculopathy


Primary Care Physician: 


Tejas Oconnor MD





Skilled Need: Nursing, physical therapy and home health aide





- Discharge Diagnosis/Problem(s)


(1) Congestive heart failure (CHF)


SNOMED Code(s): 20343954


   ICD Code: I50.9 - HEART FAILURE, UNSPECIFIED   Status: Acute   Priority: High

  


Qualifiers: 


   Heart failure type: diastolic   Heart failure chronicity: acute on chronic   

Qualified Code(s): I50.33 - Acute on chronic diastolic (congestive) heart 

failure   





(2) Acute respiratory failure with hypoxia


SNOMED Code(s): 78515067, 561712357


   ICD Code: J96.01 - ACUTE RESPIRATORY FAILURE WITH HYPOXIA   Status: Acute   





(3) Acute on chronic kidney failure


SNOMED Code(s): 965517656


   ICD Code: N17.9 - ACUTE KIDNEY FAILURE, UNSPECIFIED; N18.9 - CHRONIC KIDNEY 

DISEASE, UNSPECIFIED   Status: Acute   


Qualifiers: 


   Acute renal failure type: unspecified   Chronic kidney disease stage 3 

subtype: stage 3b (GFR 30-44) 





(4) Anemia


SNOMED Code(s): 186875139


   ICD Code: D64.9 - ANEMIA, UNSPECIFIED   Status: Acute   


Qualifiers: 


   Anemia type: due to chronic kidney disease   Chronic kidney disease stage: 

stage 4 (severe)   Qualified Code(s): N18.4 - Chronic kidney disease, stage 4 

(severe); D63.1 - Anemia in chronic kidney disease   





(5) Thrombocytopenia


SNOMED Code(s): 978775752


   ICD Code: D69.6 - THROMBOCYTOPENIA, UNSPECIFIED   Status: Acute   





(6) Pancreatic carcinoma


SNOMED Code(s): 476704288


   ICD Code: C25.9 - MALIGNANT NEOPLASM OF PANCREAS, UNSPECIFIED   Status: 

Chronic   Priority: High   





- Patient Summary/Data


Hospital Course: 





Marlee presented to the emergency room with progressive weakness and dyspnea.  

Work-up in the emergency room revealed evidence for congestive heart failure 

with anasarca, no pleural effusions as well as some ascites.  Her kidney 

function had decreased from a baseline of stage IIIb to stage IV.  Initially 

there was some concern for pneumonia versus atelectasis leading to a fever noted

in the emergency room.  She was started on empiric antibiotics as well as 

diuresis and admitted to the hospital for further management.  She required 

supplemental oxygen at the time of admission.  Over the course of the next 

couple of days there was improvement in her respiratory status.  She did not 

have any additional fevers other than that alone temperature elevation in the 

emergency room so antibiotics were discontinued.  Diuretics were continued with 

steady improvement in her symptoms as well as her respiratory status.  

Supplemental oxygen requirements decreased throughout the course of the hospital

stay but she did continue to require supplemental oxygen.  She had regained 

enough strength and had enough improvement in her symptoms that she was 

initially feeling ready for discharge on 4/4.  Unfortunately she developed 

increasing pain in her left thigh area as well as weakness in both legs.  X-rays

of the left femur were unremarkable but a CT scan of the lumbar spine showed 

foraminal stenosis in the lower lumbar spine which was likely the cause for her 

discomfort and weakness.  She was started on dexamethasone and over the next 24 

hours did have improvement in the left leg pain.  Her strength is better but she

is weak compared to baseline.  She has some mild pain in the right thigh area 

today.  We did get an echocardiogram on the morning of discharge which showed a 

normal ejection fraction but did show evidence for grade 2 diastolic dysfunction

as well as moderate aortic insufficiency.  Volume status has been steadily 

improving.  We did increase her Lasix from 20 mg up to 40 mg daily.  She is 

currently receiving adequate pain control with acetaminophen alone.  Since she 

has responded to the steroids were going to continue this for another 8 days 

with 4 mg twice a day for 3 days and then 4 mg daily for 5 days.  She may 

benefit from injections if she does continue to get relief but does not have 

resolution of the leg pain.  Discussions were held about her advanced cancer.  

At this point she would like to try home care with some physical therapy with 

the hope that she can regain some strength and endurance.  She is aware of 

hospice when her disease progresses further.  She is not currently ready to make

the transition to hospice at this time.  She will be discharged to home with 

help from her daughter as well as home care services.





- Patient Instructions


Diet: Usual Diet as Tolerated


Activity: As Tolerated


Showering/Bathing: May Shower


Notify Provider of: Fever, Increased Pain


Other/Special Instructions: 1. You were in the hospital for management of acute 

respiratory failure with hypoxia caused by diastolic (difficulty relaxing) 

congestive heart failure.  Your condition has been improving with diuretic 

therapy.  I do recommend that we increase your furosemide from 20 to 40 mg 

daily.  You do qualify for home oxygen and we have set this up through [facility]. 

2. During the hospital stay you developed pain in your left and then your right 

leg.  I suspect this is related to spinal stenosis because of arthritis in your 

lower lumbar spine.  We have started you on dexamethasone to help reduce 

inflammation and hopefully reduce pain.  Please take 4 mg twice daily tonight 

and for 3 more days.  Then take 4 mg once daily for 5 days.  You may use 

acetaminophen for breakthrough pain.  If you have additional pain after the 

steroids have ended talk to Dr. Oconnor about epidural steroid injections.  3. 

Continue your other home medications as previously prescribed.  4.  I have 

placed a referral to home health care.  They will provide nursing, physical 

therapy and home health aide services to help ease your transition home.  5. 

Follow up with Dr. Oconnor in the next 1 week.





- Discharge Plan


*PRESCRIPTION DRUG MONITORING PROGRAM REVIEWED*: Not Applicable


*COPY OF PRESCRIPTION DRUG MONITORING REPORT IN PATIENT OLAYINKA: Not Applicable


Prescriptions/Med Rec: 


dexAMETHasone [Dexamethasone] 4 mg PO ASDIRECTED #12 tab


Furosemide 40 mg PO DAILY #30 tablet


Home Medications: 


                                    Home Meds





Levothyroxine Sodium [Synthroid] 75 mcg PO DAILY 10/01/14 [History]


Rosuvastatin [Crestor] 40 mg PO BEDTIME 10/01/14 [History]


amLODIPine Besylate [Amlodipine Besylate] 10 mg PO DAILY 10/01/14 [History]


Omeprazole [Prilosec] 20 mg PO DAILY 03/02/15 [History]


Acetaminophen 650 mg PO Q6HR PRN 12/11/20 [History]


Cyanocobalamin (Vitamin B-12) [Vitamin B-12] 100 mcg PO DAILY 12/11/20 [History]


Docusate Sodium [Colace] 100 mg PO BID PRN 12/11/20 [History]


Metoprolol Tartrate [Lopressor] 50 mg PO BID 12/11/20 [History]


bisacodyL [Dulcolax] 5 mg PO DAILY PRN 12/11/20 [History]


Aspirin [Halfprin] 81 mg PO DAILY 03/24/21 [History]


Clopidogrel [Plavix] 75 mg PO DAILY 03/24/21 [History]


Hydrocodone/Acetaminophen [Hydrocodon-Acetaminophen 5-325] 1 each PO Q6HR PRN 

03/24/21 [History]


Prochlorperazine [Compazine] 10 mg PO Q6HR PRN 03/24/21 [History]


glipiZIDE [Glucotrol] 5 mg PO DAILY 03/24/21 [History]


Cholecalciferol (Vitamin D3) [Vitamin D3] 1,000 unit PO DAILY 04/01/21 [History]


Furosemide 40 mg PO DAILY #30 tablet 04/05/21 [Rx]


dexAMETHasone [Dexamethasone] 4 mg PO ASDIRECTED #12 tab 04/05/21 [Rx]








Oxygen Therapy Mode: Nasal Cannula


Oxygen Flow Rate (L/min): 4


Patient Handouts:  Spinal Stenosis, Dexamethasone tablets


Referrals: 


Tejas Oconnor MD [Primary Care Provider] - 04/12/21 1:30 pm (1 week -follow-up

hospital stay for diastolic congestive heart failure with hypoxic respiratory 

failure)





- Discharge Summary/Plan Comment


DC Time >30 min.: Yes (40-home O2 and HHC)





- Patient Data


Vitals - Most Recent: 


                                Last Vital Signs











Temp  35.8 C L  04/05/21 10:16


 


Pulse  86   04/05/21 10:16


 


Resp  18   04/05/21 10:16


 


BP  178/65 H  04/05/21 10:16


 


Pulse Ox  94 L  04/05/21 10:16











Weight - Most Recent: 60.6 kg


I&O - Last 24 hours: 


                                 Intake & Output











 04/04/21 04/05/21 04/05/21





 22:59 06:59 14:59


 


Intake Total 360  830


 


Output Total 1500 300 500


 


Balance -1140 -300 330











DONNIE Results - Last 24 hrs: 


                                  Microbiology











 04/01/21 20:34 Aerobic Blood Culture - Preliminary





 Blood - Arm, Left    NO GROWTH AFTER 3 DAYS





 Anaerobic Blood Culture - Preliminary





    NO GROWTH AFTER 3 DAYS


 


 04/01/21 20:30 Aerobic Blood Culture - Preliminary





 Blood - Port-A-Cath    NO GROWTH AFTER 3 DAYS





 Anaerobic Blood Culture - Preliminary





    NO GROWTH AFTER 3 DAYS











Med Orders - Current: 


                               Current Medications





Acetaminophen (Acetaminophen 325 Mg Tab)  650 mg PO Q4H PRN


   PRN Reason: Pain (Mild 1-3)/fever


   Last Admin: 04/05/21 10:27 Dose:  650 mg


   Documented by: 


Albuterol (Albuterol 0.083% 2.5 Mg/3 Ml Neb Soln)  2.5 mg NEB Q4H PRN


   PRN Reason: Shortness Of Breath/wheezing


   Last Admin: 04/02/21 20:30 Dose:  2.5 mg


   Documented by: 


Aspirin (Aspirin 81 Mg Tab.Ec)  81 mg PO DAILY [facility]


   Last Admin: 04/05/21 10:05 Dose:  81 mg


   Documented by: 


Bisacodyl (Bisacodyl 5 Mg Tab)  5 mg PO DAILY PRN


   PRN Reason: Constipation


Clopidogrel Bisulfate (Clopidogrel 75 Mg Tab)  75 mg PO DAILY [facility]


   Last Admin: 04/05/21 10:04 Dose:  75 mg


   Documented by: 


Dexamethasone (Dexamethasone 4 Mg/Ml Sdv)  4 mg IVPUSH Q8H [facility]


   Last Admin: 04/05/21 10:27 Dose:  4 mg


   Documented by: 


Docusate Sodium (Docusate Sodium 100 Mg Cap)  100 mg PO BID PRN


   PRN Reason: Constipation


   Last Admin: 04/02/21 20:27 Dose:  100 mg


   Documented by: 


Enoxaparin Sodium (Enoxaparin 30 Mg/0.3 Ml Syringe)  30 mg SUBCUT BEDTIME [facility]


   Last Admin: 04/04/21 20:21 Dose:  30 mg


   Documented by: 


Heparin Sodium (Porcine) (Heparin Sodium 100 Units/Ml 5 Ml Syringe)  500 units 

FLUSH ASDIRECTED PRN


   PRN Reason: IV Use


   Last Admin: 04/05/21 10:37 Dose:  500 units


   Documented by: 


Levothyroxine Sodium (Levothyroxine 25 Mcg Tab)  75 mcg PO ACBREAKFAST [facility]


   Last Admin: 04/05/21 07:04 Dose:  75 mcg


   Documented by: 


Lorazepam (Lorazepam Oral Concentrate 1mg/0.5ml U/D)  0.25 mg PO Q2H PRN


   PRN Reason: Nausea


   Last Admin: 04/01/21 23:27 Dose:  0.25 mg


   Documented by: 


Metoprolol Tartrate (Metoprolol Tartrate 50 Mg Tab)  50 mg PO BID [facility]


   Last Admin: 04/05/21 10:04 Dose:  50 mg


   Documented by: 


Morphine Sulfate (Morphine 10 Mg/0.5 Ml Oral Syringe)  2.5 mg PO Q1H PRN


   PRN Reason: Dyspnea


Ondansetron HCl (Ondansetron 4 Mg/2 Ml Sdv)  4 mg IV Q4H PRN


   PRN Reason: Nausea/Vomiting


Pantoprazole Sodium (Pantoprazole 40 Mg Tab.Cr)  40 mg PO ACBREAKFAST [facility]


   Last Admin: 04/05/21 07:04 Dose:  40 mg


   Documented by: 


Polyethylene Glycol (Polyethylene Glycol 3350 Powder 17 Gm Packet)  17 gm PO 

DAILY PRN


   PRN Reason: Constipation


Sodium Chloride (Sodium Chloride 0.9% 10 Ml Syringe)  10 ml FLUSH ASDIRECTED PRN


   PRN Reason: Keep Vein Open





Discontinued Medications





Albuterol/Ipratropium (Albuterol/Ipratropium 3.0-0.5 Mg/3 Ml Neb Soln)  3 ml NEB

ONETIME ONE


   Stop: 04/01/21 18:13


   Last Admin: 04/01/21 18:32 Dose:  3 ml


   Documented by: 


Furosemide (Furosemide 40 Mg/4 Ml Vial)  40 mg IVPUSH ONETIME ONE


   Stop: 04/01/21 19:21


   Last Admin: 04/01/21 19:43 Dose:  40 mg


   Documented by: 


Furosemide (Furosemide 20 Mg/2 Ml Vial)  60 mg IVPUSH NOW ONE


   Stop: 04/02/21 08:31


   Last Admin: 04/02/21 09:47 Dose:  60 mg


   Documented by: 


Furosemide (Furosemide 20 Mg/2 Ml Vial)  60 mg IVPUSH NOW ONE


   Stop: 04/03/21 09:01


   Last Admin: 04/03/21 08:59 Dose:  60 mg


   Documented by: 


Furosemide (Furosemide 20 Mg/2 Ml Vial)  60 mg IVPUSH NOW ONE


   Stop: 04/04/21 11:01


   Last Admin: 04/04/21 12:20 Dose:  60 mg


   Documented by: 


Ceftriaxone Sodium 1 gm/ (Sodium Chloride)  50 mls @ 100 mls/hr IV Q24H [facility]


   Last Admin: 04/03/21 21:44 Dose:  100 mls/hr


   Documented by: 


Doxycycline Hyclate 100 mg/ (Sodium Chloride)  100 mls @ 100 mls/hr IV Q12H [facility]


   Last Admin: 04/03/21 22:30 Dose:  100 mls/hr


   Documented by: 


Ondansetron HCl (Ondansetron 4 Mg/2 Ml Sdv)  4 mg IVPUSH ONETIME ONE


   Stop: 04/01/21 19:20


   Last Admin: 04/01/21 19:43 Dose:  4 mg


   Documented by: 


Potassium Chloride (Potassium Chloride 20 Meq Tab.Er)  40 meq PO ONETIME ONE


   Stop: 04/04/21 09:01


   Last Admin: 04/04/21 10:11 Dose:  40 meq


   Documented by: not applicable